# Patient Record
Sex: MALE | Race: WHITE | Employment: OTHER | URBAN - METROPOLITAN AREA
[De-identification: names, ages, dates, MRNs, and addresses within clinical notes are randomized per-mention and may not be internally consistent; named-entity substitution may affect disease eponyms.]

---

## 2017-11-21 PROBLEM — R10.9 ABDOMINAL PAIN: Status: ACTIVE | Noted: 2017-11-21

## 2017-12-22 PROBLEM — R27.0 ATAXIA: Status: ACTIVE | Noted: 2017-12-22

## 2019-05-28 ENCOUNTER — APPOINTMENT (OUTPATIENT)
Dept: CT IMAGING | Age: 84
DRG: 065 | End: 2019-05-28
Payer: MEDICARE

## 2019-05-28 ENCOUNTER — APPOINTMENT (OUTPATIENT)
Dept: MRI IMAGING | Age: 84
DRG: 065 | End: 2019-05-28
Payer: MEDICARE

## 2019-05-28 ENCOUNTER — HOSPITAL ENCOUNTER (INPATIENT)
Age: 84
LOS: 3 days | Discharge: HOME HEALTH | DRG: 065 | End: 2019-05-31
Attending: EMERGENCY MEDICINE | Admitting: INTERNAL MEDICINE
Payer: MEDICARE

## 2019-05-28 DIAGNOSIS — R47.1 DYSARTHRIA: Primary | ICD-10-CM

## 2019-05-28 DIAGNOSIS — R26.9 GAIT ABNORMALITY: ICD-10-CM

## 2019-05-28 PROBLEM — G45.9 TIA (TRANSIENT ISCHEMIC ATTACK): Status: ACTIVE | Noted: 2019-05-28

## 2019-05-28 LAB
ANION GAP SERPL CALCULATED.3IONS-SCNC: 17 MMOL/L (ref 3–16)
BASE EXCESS VENOUS: -1 (ref -3–3)
BASOPHILS ABSOLUTE: 0.1 K/UL (ref 0–0.2)
BASOPHILS RELATIVE PERCENT: 1.2 %
BILIRUBIN URINE: NEGATIVE
BLOOD, URINE: NEGATIVE
BUN BLDV-MCNC: 15 MG/DL (ref 7–20)
CALCIUM IONIZED: 1.13 MMOL/L (ref 1.12–1.32)
CALCIUM SERPL-MCNC: 8.9 MG/DL (ref 8.3–10.6)
CHLORIDE BLD-SCNC: 105 MMOL/L (ref 99–110)
CLARITY: CLEAR
CO2: 19 MMOL/L (ref 21–32)
CO2: 24 MMOL/L (ref 21–32)
COLOR: YELLOW
CREAT SERPL-MCNC: 0.7 MG/DL (ref 0.8–1.3)
EKG ATRIAL RATE: 78 BPM
EKG DIAGNOSIS: NORMAL
EKG P AXIS: 49 DEGREES
EKG P-R INTERVAL: 264 MS
EKG Q-T INTERVAL: 372 MS
EKG QRS DURATION: 102 MS
EKG QTC CALCULATION (BAZETT): 424 MS
EKG R AXIS: 40 DEGREES
EKG T AXIS: 116 DEGREES
EKG VENTRICULAR RATE: 78 BPM
EOSINOPHILS ABSOLUTE: 0.5 K/UL (ref 0–0.6)
EOSINOPHILS RELATIVE PERCENT: 5.7 %
GFR AFRICAN AMERICAN: >60
GFR AFRICAN AMERICAN: >60
GFR NON-AFRICAN AMERICAN: >60
GFR NON-AFRICAN AMERICAN: >60
GLUCOSE BLD-MCNC: 117 MG/DL (ref 70–99)
GLUCOSE BLD-MCNC: 127 MG/DL (ref 70–99)
GLUCOSE BLD-MCNC: 130 MG/DL (ref 70–99)
GLUCOSE BLD-MCNC: 138 MG/DL (ref 70–99)
GLUCOSE BLD-MCNC: 94 MG/DL (ref 70–99)
GLUCOSE URINE: NEGATIVE MG/DL
HCO3 VENOUS: 24.1 MMOL/L (ref 23–29)
HCT VFR BLD CALC: 47.6 % (ref 40.5–52.5)
HEMOGLOBIN: 15.8 G/DL (ref 13.5–17.5)
INR BLD: 0.95 (ref 0.86–1.14)
KETONES, URINE: NEGATIVE MG/DL
LACTATE: 3.26 MMOL/L (ref 0.4–2)
LEUKOCYTE ESTERASE, URINE: NEGATIVE
LYMPHOCYTES ABSOLUTE: 1.4 K/UL (ref 1–5.1)
LYMPHOCYTES RELATIVE PERCENT: 17 %
MCH RBC QN AUTO: 31.3 PG (ref 26–34)
MCHC RBC AUTO-ENTMCNC: 33.1 G/DL (ref 31–36)
MCV RBC AUTO: 94.8 FL (ref 80–100)
MICROSCOPIC EXAMINATION: NORMAL
MONOCYTES ABSOLUTE: 0.8 K/UL (ref 0–1.3)
MONOCYTES RELATIVE PERCENT: 9.5 %
NEUTROPHILS ABSOLUTE: 5.3 K/UL (ref 1.7–7.7)
NEUTROPHILS RELATIVE PERCENT: 66.6 %
NITRITE, URINE: NEGATIVE
O2 SAT, VEN: 62 %
PCO2, VEN: 40.3 MM HG (ref 40–50)
PDW BLD-RTO: 14 % (ref 12.4–15.4)
PERFORMED ON: ABNORMAL
PERFORMED ON: NORMAL
PH UA: 7 (ref 5–8)
PH VENOUS: 7.38 (ref 7.35–7.45)
PLATELET # BLD: 250 K/UL (ref 135–450)
PMV BLD AUTO: 9.5 FL (ref 5–10.5)
PO2, VEN: 33 MM HG
POC ANION GAP: 13 (ref 10–20)
POC BUN: 16 MG/DL (ref 7–18)
POC CHLORIDE: 105 MMOL/L (ref 99–110)
POC CREATININE: 0.8 MG/DL (ref 0.8–1.3)
POC POTASSIUM: 3.7 MMOL/L (ref 3.5–5.1)
POC SAMPLE TYPE: ABNORMAL
POC SAMPLE TYPE: ABNORMAL
POC SODIUM: 142 MMOL/L (ref 136–145)
POTASSIUM REFLEX MAGNESIUM: 4.3 MMOL/L (ref 3.5–5.1)
PROTEIN UA: NEGATIVE MG/DL
PROTHROMBIN TIME: 10.8 SEC (ref 9.8–13)
RBC # BLD: 5.03 M/UL (ref 4.2–5.9)
SODIUM BLD-SCNC: 141 MMOL/L (ref 136–145)
SPECIFIC GRAVITY UA: <=1.005 (ref 1–1.03)
TCO2 CALC VENOUS: 25 MMOL/L
TROPONIN: <0.01 NG/ML
URINE REFLEX TO CULTURE: NORMAL
URINE TYPE: NORMAL
UROBILINOGEN, URINE: 0.2 E.U./DL
WBC # BLD: 8 K/UL (ref 4–11)

## 2019-05-28 PROCEDURE — 85025 COMPLETE CBC W/AUTO DIFF WBC: CPT

## 2019-05-28 PROCEDURE — 80047 BASIC METABLC PNL IONIZED CA: CPT

## 2019-05-28 PROCEDURE — 93005 ELECTROCARDIOGRAM TRACING: CPT | Performed by: INTERNAL MEDICINE

## 2019-05-28 PROCEDURE — 85610 PROTHROMBIN TIME: CPT

## 2019-05-28 PROCEDURE — 36415 COLL VENOUS BLD VENIPUNCTURE: CPT

## 2019-05-28 PROCEDURE — 92610 EVALUATE SWALLOWING FUNCTION: CPT

## 2019-05-28 PROCEDURE — 96365 THER/PROPH/DIAG IV INF INIT: CPT

## 2019-05-28 PROCEDURE — 94760 N-INVAS EAR/PLS OXIMETRY 1: CPT

## 2019-05-28 PROCEDURE — 84484 ASSAY OF TROPONIN QUANT: CPT

## 2019-05-28 PROCEDURE — 81003 URINALYSIS AUTO W/O SCOPE: CPT

## 2019-05-28 PROCEDURE — 70450 CT HEAD/BRAIN W/O DYE: CPT

## 2019-05-28 PROCEDURE — 92526 ORAL FUNCTION THERAPY: CPT

## 2019-05-28 PROCEDURE — 83605 ASSAY OF LACTIC ACID: CPT

## 2019-05-28 PROCEDURE — 1200000000 HC SEMI PRIVATE

## 2019-05-28 PROCEDURE — 6370000000 HC RX 637 (ALT 250 FOR IP): Performed by: INTERNAL MEDICINE

## 2019-05-28 PROCEDURE — 6360000004 HC RX CONTRAST MEDICATION: Performed by: EMERGENCY MEDICINE

## 2019-05-28 PROCEDURE — 83036 HEMOGLOBIN GLYCOSYLATED A1C: CPT

## 2019-05-28 PROCEDURE — 6360000002 HC RX W HCPCS: Performed by: INTERNAL MEDICINE

## 2019-05-28 PROCEDURE — 2580000003 HC RX 258: Performed by: INTERNAL MEDICINE

## 2019-05-28 PROCEDURE — 70496 CT ANGIOGRAPHY HEAD: CPT

## 2019-05-28 PROCEDURE — 2580000003 HC RX 258: Performed by: EMERGENCY MEDICINE

## 2019-05-28 PROCEDURE — 82803 BLOOD GASES ANY COMBINATION: CPT

## 2019-05-28 PROCEDURE — 99291 CRITICAL CARE FIRST HOUR: CPT

## 2019-05-28 PROCEDURE — 70551 MRI BRAIN STEM W/O DYE: CPT

## 2019-05-28 PROCEDURE — 70498 CT ANGIOGRAPHY NECK: CPT

## 2019-05-28 RX ORDER — ATORVASTATIN CALCIUM 40 MG/1
40 TABLET, FILM COATED ORAL EVERY OTHER DAY
Status: DISCONTINUED | OUTPATIENT
Start: 2019-05-29 | End: 2019-05-31 | Stop reason: HOSPADM

## 2019-05-28 RX ORDER — DEXTROSE MONOHYDRATE 50 MG/ML
100 INJECTION, SOLUTION INTRAVENOUS PRN
Status: DISCONTINUED | OUTPATIENT
Start: 2019-05-28 | End: 2019-05-28 | Stop reason: SDUPTHER

## 2019-05-28 RX ORDER — GABAPENTIN 300 MG/1
300 CAPSULE ORAL 2 TIMES DAILY
Status: DISCONTINUED | OUTPATIENT
Start: 2019-05-28 | End: 2019-05-31 | Stop reason: HOSPADM

## 2019-05-28 RX ORDER — ONDANSETRON 2 MG/ML
4 INJECTION INTRAMUSCULAR; INTRAVENOUS EVERY 6 HOURS PRN
Status: DISCONTINUED | OUTPATIENT
Start: 2019-05-28 | End: 2019-05-31 | Stop reason: HOSPADM

## 2019-05-28 RX ORDER — SODIUM CHLORIDE 0.9 % (FLUSH) 0.9 %
10 SYRINGE (ML) INJECTION EVERY 12 HOURS SCHEDULED
Status: DISCONTINUED | OUTPATIENT
Start: 2019-05-28 | End: 2019-05-31 | Stop reason: HOSPADM

## 2019-05-28 RX ORDER — SODIUM CHLORIDE, SODIUM LACTATE, POTASSIUM CHLORIDE, AND CALCIUM CHLORIDE .6; .31; .03; .02 G/100ML; G/100ML; G/100ML; G/100ML
1000 INJECTION, SOLUTION INTRAVENOUS ONCE
Status: COMPLETED | OUTPATIENT
Start: 2019-05-28 | End: 2019-05-28

## 2019-05-28 RX ORDER — ASPIRIN 81 MG/1
81 TABLET ORAL
Status: DISCONTINUED | OUTPATIENT
Start: 2019-05-28 | End: 2019-05-29

## 2019-05-28 RX ORDER — DEXTROSE MONOHYDRATE 25 G/50ML
12.5 INJECTION, SOLUTION INTRAVENOUS PRN
Status: DISCONTINUED | OUTPATIENT
Start: 2019-05-28 | End: 2019-05-28 | Stop reason: SDUPTHER

## 2019-05-28 RX ORDER — GABAPENTIN 300 MG/1
300 CAPSULE ORAL 2 TIMES DAILY
COMMUNITY

## 2019-05-28 RX ORDER — ATORVASTATIN CALCIUM 40 MG/1
40 TABLET, FILM COATED ORAL EVERY OTHER DAY
Status: DISCONTINUED | OUTPATIENT
Start: 2019-05-28 | End: 2019-05-28

## 2019-05-28 RX ORDER — DEXTROSE MONOHYDRATE 25 G/50ML
12.5 INJECTION, SOLUTION INTRAVENOUS PRN
Status: DISCONTINUED | OUTPATIENT
Start: 2019-05-28 | End: 2019-05-31 | Stop reason: HOSPADM

## 2019-05-28 RX ORDER — GABAPENTIN 600 MG/1
600 TABLET ORAL NIGHTLY
Status: DISCONTINUED | OUTPATIENT
Start: 2019-05-28 | End: 2019-05-31 | Stop reason: HOSPADM

## 2019-05-28 RX ORDER — SODIUM CHLORIDE 0.9 % (FLUSH) 0.9 %
10 SYRINGE (ML) INJECTION PRN
Status: DISCONTINUED | OUTPATIENT
Start: 2019-05-28 | End: 2019-05-31 | Stop reason: HOSPADM

## 2019-05-28 RX ORDER — NICOTINE POLACRILEX 4 MG
15 LOZENGE BUCCAL PRN
Status: DISCONTINUED | OUTPATIENT
Start: 2019-05-28 | End: 2019-05-28 | Stop reason: SDUPTHER

## 2019-05-28 RX ORDER — NICOTINE POLACRILEX 4 MG
15 LOZENGE BUCCAL PRN
Status: DISCONTINUED | OUTPATIENT
Start: 2019-05-28 | End: 2019-05-31 | Stop reason: HOSPADM

## 2019-05-28 RX ORDER — DEXTROSE MONOHYDRATE 50 MG/ML
100 INJECTION, SOLUTION INTRAVENOUS PRN
Status: DISCONTINUED | OUTPATIENT
Start: 2019-05-28 | End: 2019-05-31 | Stop reason: HOSPADM

## 2019-05-28 RX ORDER — GABAPENTIN 600 MG/1
600 TABLET ORAL NIGHTLY
COMMUNITY

## 2019-05-28 RX ADMIN — ENOXAPARIN SODIUM 40 MG: 40 INJECTION SUBCUTANEOUS at 17:23

## 2019-05-28 RX ADMIN — GABAPENTIN 600 MG: 600 TABLET, FILM COATED ORAL at 20:56

## 2019-05-28 RX ADMIN — SODIUM CHLORIDE, POTASSIUM CHLORIDE, SODIUM LACTATE AND CALCIUM CHLORIDE 1000 ML: 600; 310; 30; 20 INJECTION, SOLUTION INTRAVENOUS at 09:56

## 2019-05-28 RX ADMIN — INSULIN GLARGINE 30 UNITS: 100 INJECTION, SOLUTION SUBCUTANEOUS at 20:58

## 2019-05-28 RX ADMIN — ASPIRIN 81 MG: 81 TABLET, COATED ORAL at 17:23

## 2019-05-28 RX ADMIN — IOPAMIDOL 80 ML: 755 INJECTION, SOLUTION INTRAVENOUS at 09:14

## 2019-05-28 RX ADMIN — GABAPENTIN 300 MG: 300 CAPSULE ORAL at 17:23

## 2019-05-28 RX ADMIN — Medication 10 ML: at 21:00

## 2019-05-28 ASSESSMENT — PAIN SCALES - GENERAL: PAINLEVEL_OUTOF10: 0

## 2019-05-28 NOTE — PROGRESS NOTES
Patient admitted to [facility], patient alert and oriented x4, VSS. NIHSS 1, neuro checks WNL. Patient and family feel like speech and aphasia has improved since this morning. Speech called to evaluate patient. Patient oriented to room and call light, call light and belongings within reach. Bed alarm on for safety. Will continue to monitor.

## 2019-05-28 NOTE — CONSULTS
Medications Prior to Admission:      Home Medications           Prior to Admission medications    Medication Sig Start Date End Date Taking? Authorizing Provider   gabapentin (NEURONTIN) 300 MG capsule Take 300 mg by mouth 2 times daily. Indications: breakfast and lunch     Yes Historical Provider, MD   gabapentin (NEURONTIN) 600 MG tablet Take 600 mg by mouth nightly.     Yes Historical Provider, MD   aspirin 81 MG tablet Take 81 mg by mouth Daily with supper     Yes Historical Provider, MD   metFORMIN (GLUCOPHAGE) 1000 MG tablet Take 2,000 mg by mouth nightly     Yes Historical Provider, MD   Insulin Detemir (LEVEMIR FLEXPEN SC) Inject 30 Units into the skin nightly     Yes Historical Provider, MD   Misc. Devices (ROLLER Doran) MISC 1 each by Does not apply route daily 12/24/17     Ricky Dunne MD   Exenatide (BYDUREON) 2 MG PEN Inject 2 mg into the skin every 7 days        Historical Provider, MD   atorvastatin (LIPITOR) 40 MG tablet Take 40 mg by mouth every other day       Historical Provider, MD         ROS:   Constitutional- No weight loss or fevers, no night sweats   Eyes- No diplopia. No photophobia. Ears/nose/throat- No dysphagia (had some cough swallowing earlier today). + Dysarthria   Cardiovascular- No palpitations, +irregular pulse. No chest pain   Respiratory- No dyspnea. No Cough   Gastrointestinal- No Abdominal pain. No Vomiting. Musculoskeletal- + lower right arm pain  Skin- No rash. No easy bruising. Psychiatric- No depression. Endocrine- + diabetes   Hematologic- No bleeding difficulty. Neurologic- No weakness. No Headache. Exam:  Blood pressure (!) 161/88, pulse 90, temperature 98.1 °F (36.7 °C), temperature source Oral, resp. rate 16, height 6' 1\" (1.854 m), weight 192 lb (87.1 kg), SpO2 95 %. Constitutional    Vital signs: BP, HR, and RR reviewed   General Alert, no distress, well-nourished  Eyes: EOMI, PERRLA  Cardiovascular: pulses symmetric in all 4 extremities. No peripheral edema. Psychiatric: cooperative with examination, no  psychotic behavior noted. Neurologic  Mental status:   orientation to person, place, and time   General fund of knowledge grossly intact   Memory grossly intact. He remembers 2/3 words and third word with cue   Attention intact as able to attend well to the exam     Language fluent in conversation   Comprehension intact; follows simple commands  Cranial nerves:   CN2: Visual Fields full w/o extinction on confrontational testing,   CN 3,4,6: extraocular muscles intact,  CN5: facial sensation symmetric   CN7:face symmetric without dysarthria,   CN8: hearing grossly intact  CN9: palate elevated symmetrically  CN11: trap full strength on shoulder shrug  CN12: tongue midline with protrusion  Strength: No prontator drift. Good strength in all 4 extremities   Deep tendon reflexes: normal in all 4 extremities  Sensory: light touch intact in all 4 extremities. No sensory extinction on double simultaneous stimulation  Cerebellar/coordination: finger nose finger grossly normal although exam limited by intention tremor.  Heel to shin normal.   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       No visualized acute vessel occlusion.       Scattered areas of nonhemodynamically significant stenosis of the cervical arteries as detailed above.       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       No visualized acute vessel occlusion.       Scattered areas of nonhemodynamically significant stenosis of the cervical arteries as detailed above.       CT Head WO Contrast   Final Result       Age-related changes in the brain without acute intracranial abnormality. Impression:  Bea Gallagher is a 89 [address] y.o. male who presented with symptoms of TIA vs ischemic stroke. CT head was negative and CTA do not do not indicate hemodynamically significant stenosis. Recommendations:    TIA vs Stroke. Symptoms are resolving.  -He is on telemetry. He had echo in 11/2018 which showed mildly reduced systolic function and paradoxical septal motion. No major valve abnormalities. BP is high on admission and he is not on BP meds at home. Recent use of NSAIDs noted. -Agree with cardiac evaluation and permissive HTN  -MRI is pending  -Swallowing evaluation was normal  -He is on ASA and Lipitor 40 mg    Tremor  -Appears like intention tremor however he does have some rigidity of the RUE concerning for Parkinsons  -Advise outpatient follow-up for further evaluation of his tremor. A copy of this note was provided for Dr Trinh Crews MD     Will discuss with attending Dr. Vaughn Xiao.      Ericaluis alberto Dupree, PGY-2

## 2019-05-28 NOTE — ED NOTES
Pt given 3oz of water pt took the water in multiple sips and coughed each time. Pt did not know why he was coughing.       Maci Padron RN  05/28/19 [id number]

## 2019-05-28 NOTE — PROGRESS NOTES
Treatment Plan  Requires SLP Intervention: Yes  Duration/Frequency of Treatment: 2-4x  D/C Recommendations: To be determined       Recommended Diet and Intervention  Diet Solids Recommendation: Regular  Liquid Consistency Recommendation: Thin-Make NPO if s/s aspiration emerge and alert SLP  Will con't to monitor for need for full Speech Eval   Recommended Form of Meds: PO  Recommendations: Dysphagia treatment  Therapeutic Interventions: Diet tolerance monitoring;Patient/Family education    Compensatory Swallowing Strategies  Compensatory Swallowing Strategies: Upright as possible for all oral intake    Treatment/Goals  1-The patient will tolerate recommended diet without observed clinical signs of aspiration    2- The pt/family will demonstrate understanding of swallowing recommendations and concerns. 5/28-   The pt was educated to purpose of the visit, anatomy and physiology of the swallow, impact CVA can have on swallowing function, s/s of aspiration, swallowing strategies, diet recommendations and possibility of being made NPO if s/s aspiration emerge. The pt stated comprehension. con't goal      General  Chart Reviewed: Yes  Behavior/Cognition: Alert; Cooperative;Pleasant mood  Respiratory Status: Room air  Communication Observation: Functional  Follows Directions: Complex  Dentition: Some missing teeth;Poor dental/oral hygeine(has partials at home- son is bringing them today)  Patient Positioning: Upright in bed  Baseline Vocal Quality: Normal  Volitional Cough: Strong  Prior Dysphagia History: no history of dysphagia   Consistencies Administered: Soft solid; Thin - cup; Thin - straw; Ice Chips;Puree           Vision/Hearing  Vision  Vision: Impaired  Vision Exceptions: Wears glasses at all times  Hearing  Hearing: Within functional limits    Oral Motor Deficits  Oral/Motor  Oral Motor: Within functional limits    Oral Phase Dysfunction  no anterior spillage or oral residue with any consistency.  Mastication

## 2019-05-28 NOTE — ED PROVIDER NOTES
ASPIRIN 81 MG TABLET    Take 81 mg by mouth Daily with supper    ATORVASTATIN (LIPITOR) 40 MG TABLET    Take 40 mg by mouth every other day    EXENATIDE (BYDUREON) 2 MG PEN    Inject 2 mg into the skin every 7 days     GABAPENTIN (NEURONTIN) 300 MG CAPSULE    Take 300 mg by mouth 2 times daily. Indications: breakfast and lunch    GABAPENTIN (NEURONTIN) 600 MG TABLET    Take 600 mg by mouth nightly. INSULIN DETEMIR (LEVEMIR FLEXPEN SC)    Inject 30 Units into the skin nightly    METFORMIN (GLUCOPHAGE) 1000 MG TABLET    Take 2,000 mg by mouth nightly    MISC. DEVICES (ROLLER WALKER) MISC    1 each by Does not apply route daily       Allergies     He is allergic to cat hair extract and tetracyclines & related. Physical Exam     INITIAL VITALS: BP: (!) 189/94, Temp: 98.3 °F (36.8 °C), Pulse: 82, Resp: 12, SpO2: 95 %   Physical Exam   Constitutional: He is oriented to person, place, and time. He appears well-developed and well-nourished. HENT:   Head: Normocephalic and atraumatic. Very subtle/questionable left-sided nasolabial fold flattening. Tongue protrusion to the left although tongue movement crosses midline normally   Eyes: Pupils are equal, round, and reactive to light. EOM are normal.   Neck: Normal range of motion. Neck supple. Cardiovascular: Normal rate, regular rhythm, normal heart sounds and intact distal pulses. Pulmonary/Chest: Effort normal and breath sounds normal. He has no wheezes. Abdominal: Soft. Bowel sounds are normal. There is no tenderness. Musculoskeletal: Normal range of motion. He exhibits no edema or tenderness. Tremor in the left upper extremity which patient states that his baseline. Normal  strength. Neurological: He is alert and oriented to person, place, and time. Dysarthria noted without a fascia. Patient does have some tremor in his left upper extremity with some difficulty with finger-nose.   He is unsteady when ambulating but is able to stand under (Results Pending)       LABS:   Results for orders placed or performed during the hospital encounter of 05/28/19   CBC Auto Differential   Result Value Ref Range    WBC 8.0 4.0 - 11.0 K/uL    RBC 5.03 4.20 - 5.90 M/uL    Hemoglobin 15.8 13.5 - 17.5 g/dL    Hematocrit 47.6 40.5 - 52.5 %    MCV 94.8 80.0 - 100.0 fL    MCH 31.3 26.0 - 34.0 pg    MCHC 33.1 31.0 - 36.0 g/dL    RDW 14.0 12.4 - 15.4 %    Platelets 328 240 - 638 K/uL    MPV 9.5 5.0 - 10.5 fL    Neutrophils % 66.6 %    Lymphocytes % 17.0 %    Monocytes % 9.5 %    Eosinophils % 5.7 %    Basophils % 1.2 %    Neutrophils # 5.3 1.7 - 7.7 K/uL    Lymphocytes # 1.4 1.0 - 5.1 K/uL    Monocytes # 0.8 0.0 - 1.3 K/uL    Eosinophils # 0.5 0.0 - 0.6 K/uL    Basophils # 0.1 0.0 - 0.2 K/uL   Basic Metabolic Panel w/ Reflex to MG   Result Value Ref Range    Sodium 141 136 - 145 mmol/L    Potassium reflex Magnesium 4.3 3.5 - 5.1 mmol/L    Chloride 105 99 - 110 mmol/L    CO2 19 (L) 21 - 32 mmol/L    Anion Gap 17 (H) 3 - 16    Glucose 127 (H) 70 - 99 mg/dL    BUN 15 7 - 20 mg/dL    CREATININE 0.7 (L) 0.8 - 1.3 mg/dL    GFR Non-African American >60 >60    GFR African American >60 >60    Calcium 8.9 8.3 - 10.6 mg/dL   Troponin   Result Value Ref Range    Troponin <0.01 <0.01 ng/mL   Protime-INR   Result Value Ref Range    Protime 10.8 9.8 - 13.0 sec    INR 0.95 0.86 - 1.14   Urinalysis Reflex to Culture   Result Value Ref Range    Color, UA Yellow Straw/Yellow    Clarity, UA Clear Clear    Glucose, Ur Negative Negative mg/dL    Bilirubin Urine Negative Negative    Ketones, Urine Negative Negative mg/dL    Specific Gravity, UA <=1.005 1.005 - 1.030    Blood, Urine Negative Negative    pH, UA 7.0 5.0 - 8.0    Protein, UA Negative Negative mg/dL    Urobilinogen, Urine 0.2 <2.0 E.U./dL    Nitrite, Urine Negative Negative    Leukocyte Esterase, Urine Negative Negative    Microscopic Examination Not Indicated     Urine Reflex to Culture Not Indicated     Urine Type Not Specified POCT Glucose   Result Value Ref Range    POC Glucose 117 (H) 70 - 99 mg/dl    Performed on ACCU-CHEK    POCT Venous   Result Value Ref Range    pH, Judith 7.385 7.350 - 7.450    pCO2, Judith 40.3 40.0 - 50.0 mm Hg    pO2, Judith 33 Not Established mm Hg    HCO3, Venous 24.1 23.0 - 29.0 mmol/L    Base Excess, Judith -1 -3 - 3    O2 Sat, Judith 62 Not Established %    TC02 (Calc), Judith 25 Not Established mmol/L    Lactate 3.26 (H) 0.40 - 2.00 mmol/L    Sample Type JUDITH     Performed on SEE BELOW    POCT Venous   Result Value Ref Range    POC Sodium 142 136 - 145 mmol/L    POC Potassium 3.7 3.5 - 5.1 mmol/L    POC Chloride 105 99 - 110 mmol/L    CO2 24 21 - 32 mmol/L    POC Anion Gap 13 10 - 20    POC Glucose 130 (H) 70 - 99 mg/dl    POC BUN 16 7 - 18 mg/dL    POC Creatinine 0.8 0.8 - 1.3 mg/dL    GFR Non-African American >60 >60    GFR African American >60     Calcium, Ion 1.13 1.12 - 1.32 mmol/L    Sample Type JUDITH     Performed on SEE BELOW        ED BEDSIDE ULTRASOUND:      RECENT VITALS:  BP: (!) 158/89, Temp: 98.3 °F (36.8 °C),Pulse: 91, Resp: 15, SpO2: 90 %     Procedures         ED Course     Nursing Notes, Past Medical Hx, Past Surgical Hx, Social Hx, Allergies, and Family Hx werereviewed. The patient was given thefollowing medications:  Orders Placed This Encounter   Medications    lactated ringers bolus    iopamidol (ISOVUE-370) 76 % injection 80 mL       CONSULTS:  IP CONSULT TO STROKE TEAM  IP CONSULT TO HOSPITALIST  IP CONSULT TO [address] [place] [address] / ASSESSMENT / Mary Chahal is a 80 y.o. male who presents with dysarthria and some additional findings after waking up this morning. Concern is for a wake-up stroke. Initial NIH stroke scale for me is 3 with some questionable left-sided nasolabial fold flattening, dysarthria, and some ataxia. It is difficult with the ataxia is old versus new given his intention tremor. ct/cta was performed which did not show a large vessel occlusion.   the case was discussed with the stroke team and he is not a candidate for tpa given the fact that he woke up with these symptoms and is not a candidate for angiography versus perfusion imaging as he does not have a large vessel occlusion. He did have an elevation in his lactic acid was given fluids. Unclear as to the source of the elevation. He will be admitted to the hospitalist pending further evaluation for possible stroke versus TIA. MRI has been ordered. .    Critical Care:  Due to the immediate potential for life-threatening deterioration due to possible stroke, I spent 33 minutes providing critical care. This time excludes timespent performing procedures but includes time spent on direct patient care, history retrieval, review of the chart, and discussions with patient, family, and consultant(s). Clinical Impression     1. Dysarthria    2. Gait abnormality        Disposition     PATIENT REFERRED TO:  Pauline Young MD  [id number] [place] [id number]  [phone]            DISCHARGE MEDICATIONS:  New Prescriptions    No medications on file       DISPOSITION Admitted 05/28/2019 12:01:49 PM         Adam Wilkes MD  05/28/19 [id number]

## 2019-05-28 NOTE — H&P
Hospital Medicine History & Physical      PCP: Toma Allison MD    Date of Admission: 5/28/2019    Date of Service: Pt seen/examined on 5/28/2019  8:03 AM and   Admitted to Inpatient with expected LOS greater than two midnights due to medical therapy. Chief Complaint:  Difficulty speech and gait problems    History Of Present Illness:    80 y.o. male with PMHx significant for attention, type 2 diabetes mellitus, hyperlipidemia admitted with speech difficulties and gait difficulties. Patient went to bed around 11 PM last night and then woke up this morning at around 7 and noticed that he had difficulty with speech and walking. He is still up to go to the bathroom and felt extremely unsteady. He denied any weakness in particular extremity just felt that he was very unsteady and could not get his balance like. He also noticed that his speech was not very clear and a difficulty processing and understanding language and also articulating his words  He denied any headache visual complaints  No chest pain shortness of breath  No lightheadedness  When I saw him in the emergency department he was almost back to baseline    Past Medical History:          Diagnosis Date    Diabetes mellitus ([facility] [address]     Hyperlipidemia     Tremor        Past Surgical History:      History reviewed. No pertinent surgical history. Medications Prior to Admission:      Prior to Admission medications    Medication Sig Start Date End Date Taking? Authorizing Provider   gabapentin (NEURONTIN) 300 MG capsule Take 300 mg by mouth 2 times daily. Indications: breakfast and lunch   Yes Historical Provider, MD   gabapentin (NEURONTIN) 600 MG tablet Take 600 mg by mouth nightly.    Yes Historical Provider, MD   aspirin 81 MG tablet Take 81 mg by mouth Daily with supper   Yes Historical Provider, MD   metFORMIN (GLUCOPHAGE) 1000 MG tablet Take 2,000 mg by mouth nightly   Yes Historical Provider, MD   Insulin Detemir (LEVEMIR FLEXPEN SC) oriented, thought content appropriate, normal insight  Capillary Refill: Brisk,< 3 seconds   Peripheral Pulses: +2 palpable, equal bilaterally       Labs:     Recent Labs     05/28/19  0837   WBC 8.0   HGB 15.8   HCT 47.6        Recent Labs     05/28/19  0837 05/28/19  0840     --    K 4.3  --      --    CO2 19* 24   BUN 15  --    CREATININE 0.7* 0.8   CALCIUM 8.9  --      No results for input(s): AST, ALT, BILIDIR, BILITOT, ALKPHOS in the last 72 hours. 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 No visualized acute vessel occlusion. Scattered areas of nonhemodynamically significant stenosis of the cervical arteries as detailed above. 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 No visualized acute vessel occlusion. Scattered areas of nonhemodynamically significant stenosis of the cervical arteries as detailed above. CT Head WO Contrast   Final Result      Age-related changes in the brain without acute intracranial abnormality.             MRI BRAIN WO CONTRAST    (Results Pending)       ASSESSMENT/PLAN:    Active Hospital Problems    Diagnosis Date Noted    TIA (transient ischemic attack) [G45.9] 05/28/2019       Acute Medical Issues Being Addressed:    58-year-old gentleman admitted to the hospital with speech and gait difficulties    Dysarthria and gait abnormalities will need to rule out TIA  CT angiogram of the head and neck negative  Cardiac monitor  Strick intake and output  Aspirin and statin  Will get MRI of the brain without contrast  Cardiology consult  Check echocardiogram    Type 2 diabetes mellitus  Continue Lantus  Sliding-scale insulin    Hyperlipidemia  Continue statin      DVT Prophylaxis: sc lovenox   Diet: Diet NPO Effective Now  Code Status: Full Code    PT/OT Eval Status: will have eval if appropriate given patient's condition    Dispo - once acute medical process is resolved       Lacy Monroe MD    Thank you Corina Jacobsen MD for the opportunity to be involved in this patient's care. If you have any questions or concerns please feel free to contact me.

## 2019-05-29 LAB
A/G RATIO: 2 (ref 1.1–2.2)
ALBUMIN SERPL-MCNC: 4.1 G/DL (ref 3.4–5)
ALP BLD-CCNC: 75 U/L (ref 40–129)
ALT SERPL-CCNC: 13 U/L (ref 10–40)
ANION GAP SERPL CALCULATED.3IONS-SCNC: 12 MMOL/L (ref 3–16)
AST SERPL-CCNC: 13 U/L (ref 15–37)
BASOPHILS ABSOLUTE: 0.1 K/UL (ref 0–0.2)
BASOPHILS RELATIVE PERCENT: 0.9 %
BILIRUB SERPL-MCNC: 0.7 MG/DL (ref 0–1)
BILIRUBIN DIRECT: <0.2 MG/DL (ref 0–0.3)
BILIRUBIN, INDIRECT: ABNORMAL MG/DL (ref 0–1)
BUN BLDV-MCNC: 15 MG/DL (ref 7–20)
CALCIUM SERPL-MCNC: 9.1 MG/DL (ref 8.3–10.6)
CHLORIDE BLD-SCNC: 106 MMOL/L (ref 99–110)
CO2: 23 MMOL/L (ref 21–32)
CREAT SERPL-MCNC: 0.7 MG/DL (ref 0.8–1.3)
EOSINOPHILS ABSOLUTE: 0.4 K/UL (ref 0–0.6)
EOSINOPHILS RELATIVE PERCENT: 4.8 %
ESTIMATED AVERAGE GLUCOSE: 148.5 MG/DL
GFR AFRICAN AMERICAN: >60
GFR NON-AFRICAN AMERICAN: >60
GLOBULIN: 2.1 G/DL
GLUCOSE BLD-MCNC: 113 MG/DL (ref 70–99)
GLUCOSE BLD-MCNC: 184 MG/DL (ref 70–99)
GLUCOSE BLD-MCNC: 87 MG/DL (ref 70–99)
GLUCOSE BLD-MCNC: 97 MG/DL (ref 70–99)
GLUCOSE BLD-MCNC: 99 MG/DL (ref 70–99)
HBA1C MFR BLD: 6.8 %
HCT VFR BLD CALC: 45.2 % (ref 40.5–52.5)
HEMOGLOBIN: 15.1 G/DL (ref 13.5–17.5)
INR BLD: 1.11 (ref 0.86–1.14)
LYMPHOCYTES ABSOLUTE: 1.1 K/UL (ref 1–5.1)
LYMPHOCYTES RELATIVE PERCENT: 12.8 %
MAGNESIUM: 1.8 MG/DL (ref 1.8–2.4)
MCH RBC QN AUTO: 30.8 PG (ref 26–34)
MCHC RBC AUTO-ENTMCNC: 33.4 G/DL (ref 31–36)
MCV RBC AUTO: 92.2 FL (ref 80–100)
MONOCYTES ABSOLUTE: 1 K/UL (ref 0–1.3)
MONOCYTES RELATIVE PERCENT: 10.8 %
NEUTROPHILS ABSOLUTE: 6.3 K/UL (ref 1.7–7.7)
NEUTROPHILS RELATIVE PERCENT: 70.7 %
PDW BLD-RTO: 13.7 % (ref 12.4–15.4)
PERFORMED ON: ABNORMAL
PERFORMED ON: ABNORMAL
PERFORMED ON: NORMAL
PERFORMED ON: NORMAL
PLATELET # BLD: 244 K/UL (ref 135–450)
PMV BLD AUTO: 8.8 FL (ref 5–10.5)
POTASSIUM REFLEX MAGNESIUM: 3.6 MMOL/L (ref 3.5–5.1)
PROTHROMBIN TIME: 12.6 SEC (ref 9.8–13)
RBC # BLD: 4.9 M/UL (ref 4.2–5.9)
SODIUM BLD-SCNC: 141 MMOL/L (ref 136–145)
TOTAL PROTEIN: 6.2 G/DL (ref 6.4–8.2)
WBC # BLD: 8.9 K/UL (ref 4–11)

## 2019-05-29 PROCEDURE — 36415 COLL VENOUS BLD VENIPUNCTURE: CPT

## 2019-05-29 PROCEDURE — 1200000000 HC SEMI PRIVATE

## 2019-05-29 PROCEDURE — 6370000000 HC RX 637 (ALT 250 FOR IP): Performed by: NURSE PRACTITIONER

## 2019-05-29 PROCEDURE — 85025 COMPLETE CBC W/AUTO DIFF WBC: CPT

## 2019-05-29 PROCEDURE — 83735 ASSAY OF MAGNESIUM: CPT

## 2019-05-29 PROCEDURE — 6360000002 HC RX W HCPCS: Performed by: INTERNAL MEDICINE

## 2019-05-29 PROCEDURE — 92526 ORAL FUNCTION THERAPY: CPT

## 2019-05-29 PROCEDURE — 80053 COMPREHEN METABOLIC PANEL: CPT

## 2019-05-29 PROCEDURE — 6370000000 HC RX 637 (ALT 250 FOR IP): Performed by: INTERNAL MEDICINE

## 2019-05-29 PROCEDURE — 85610 PROTHROMBIN TIME: CPT

## 2019-05-29 PROCEDURE — 2580000003 HC RX 258: Performed by: INTERNAL MEDICINE

## 2019-05-29 PROCEDURE — 80061 LIPID PANEL: CPT

## 2019-05-29 RX ADMIN — GABAPENTIN 300 MG: 300 CAPSULE ORAL at 07:59

## 2019-05-29 RX ADMIN — GABAPENTIN 600 MG: 600 TABLET, FILM COATED ORAL at 21:53

## 2019-05-29 RX ADMIN — ENOXAPARIN SODIUM 40 MG: 40 INJECTION SUBCUTANEOUS at 07:59

## 2019-05-29 RX ADMIN — INSULIN GLARGINE 30 UNITS: 100 INJECTION, SOLUTION SUBCUTANEOUS at 22:01

## 2019-05-29 RX ADMIN — ASPIRIN 325 MG: 325 TABLET, DELAYED RELEASE ORAL at 14:01

## 2019-05-29 RX ADMIN — ATORVASTATIN CALCIUM 40 MG: 40 TABLET, FILM COATED ORAL at 23:35

## 2019-05-29 RX ADMIN — Medication 10 ML: at 22:02

## 2019-05-29 RX ADMIN — GABAPENTIN 300 MG: 300 CAPSULE ORAL at 13:55

## 2019-05-29 ASSESSMENT — PAIN SCALES - GENERAL
PAINLEVEL_OUTOF10: 0

## 2019-05-29 NOTE — PROGRESS NOTES
Speech Language Pathology  Facility/Department: [facility] 5T ORTHO/NEURO  Dysphagia Daily Treatment Note/speech / cog screen    NAME: Tre Mcallister  : 10/27/1928  MRN: [id number]    Patient Diagnosis(es):   Patient Active Problem List    Diagnosis Date Noted    TIA (transient ischemic attack) 2019    Ataxia 2017    Chest pain     Gallstones     Abdominal pain 2017     Allergies: Allergies   Allergen Reactions    Cat Hair Extract      Nasal congestion    Tetracyclines & Related Rash       Recent Chest Xray-  Not performed this admission     CT of head 19  Age-related changes in the brain without acute intracranial abnormality.     MRI 19     1. Small acute ischemic insults in the right cerebellar hemisphere. The ischemic insult in the superior medial right cerebellum demonstrates associated petechial hemorrhagic change. 2. 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           Previous MBS - none    Chart reviewed. Medical Diagnosis: TIA  Treatment Diagnosis: dysphagia    BSE Impression 19  Pt reports speech difficulties are resolving. Pt without partial dentures which may be a factor with speech sound production. Pt reports he never eats without his dentures- son is to bring from home today. ROM or oral structures was [facility]. Oral- no anterior spillage or oral residue with any consistency. Mastication was prolonged, but pt typically eats with his dentures. Pharyngeal-Pt demonstrated no s/s aspiration with any consistency presented. Pt able to initiate swallow without difficulty with laryngeal movement observed. Vocal quality remained clear throughout. No coughing, throat clearing or choking observed with any consistency.  Pt consumed 3oz water uninterrupted by cup without difficulty.     MBS results -  Not indicated    Pain: none    Current Diet : regular with thin liquids    Treatment:  Pt seen bedside to address the following goals:  1-The patient will tolerate recommended diet without observed clinical signs of aspiration  5/29:  RN with no concerns for swallowing, pt /son deny problems as well. Pt analyzed with solid texture as well as thin liquids via cup and straw with no overt signs of aspiration. Volume of voice somewhat reduced, which pt and son state is baseline. Pt also reports having post nasal drip and has chronic throat clearing. Goal met, cont one more session to ensure consistency    2- The pt/family will demonstrate understanding of swallowing recommendations and concerns. 5/28-   The pt was educated to purpose of the visit, anatomy and physiology of the swallow, impact CVA can have on swallowing function, s/s of aspiration, swallowing strategies, diet recommendations and possibility of being made NPO if s/s aspiration emerge. The pt stated comprehension. con't goal  5/29; pt /son educated to purpose of visit, pt just spoke with neuro NP and is aware MRI showed acute stroke. Reviewed s/s of aspiration and concern if aspiration occurs. Instructed to notify staff if any signs emerge. Pt and son stated understanding and agreeable  Goal met, cont to ensure consistency    Speech/language/cognitive screen:  Discussed speech eval with pt, who was somewhat reluctant, as he felt  Speech back to baseline. Pt agreeable to completing the [facility], which pt scored 28/30, which is WNL's. Speech is clear and free of dysarthria, no word finding noted. Pt using humor, telling jokes and conversing appropriately. Instructed pt to notify staff if any cognitive/speech issues emerge. Patient/Family/Caregiver Education:  As above    Compensatory Strategies:  90 degrees all meals     Plan:  Continued daily Dysphagia treatment with goals per  plan of care.   Diet recommendations: cont regular diet/thin liquids- if any s/s of aspiration emerge, or there is respiratory decline,  make NPO until further evaluated by SLP  DC recommendation:most likely will not require follow up  Treatment: 25  D/W nursing [facility]  Needs met prior to leaving room, call button in reach. Daphney Lagos M.S./[facility]-SLP #[id number]  Pg.  # [phone]    If patient is discharged prior to next treatment, this note will serve as the discharge summary

## 2019-05-29 NOTE — PROGRESS NOTES
Pt a/o x4. VSS. No reports of nausea/ vomiting. Tolerating general diet. No complaints of pain. Pt is up x1 person assist. Pt tolerating activity well. Urinary output adequate. NIH score 0. Will continue to monitor.

## 2019-05-29 NOTE — PROGRESS NOTES
Neurology Follow Up  Note    Updates:  Seen for difficulty walking. He has no new complaints. ROS:  Constitutional: denies fatigue, malaise  Eyes: denies any vision changes,  Musculoskeletal: denies any pain or decreased ROM, no functional deficit. Neurological: denies headache, numbness or tingling, speech problems. Exam:  Blood pressure (!) 144/84, pulse 79, temperature 98 °F (36.7 °C), temperature source Oral, resp. rate 16, height 6' 1\" (1.854 m), weight 192 lb (87.1 kg), SpO2 93 %. Constitutional    Vital signs: BP, HR, and RR reviewed   General Alert, no distress, well-nourished  Eyes: fundoscopic exam not visualized. .   Cardiovascular: pulses symmetric in all 4 extremities. No peripheral edema. Psychiatric: cooperative with examination, no  psychotic behavior noted. Neurologic  Mental status:   orientation to person, place and time. General fund of knowledge grossly intact   Memory grossly intact   Attention intact as able to attend well to the exam     Language fluent in conversation   Comprehension intact; follows simple commands  Cranial nerves:   CN2: Visual Fields full w/o extinction on confrontational testing,   CN 3,4,6: extraocular muscles intact,  CN5: facial sensation symmetric   CN7:face symmetric without dysarthria,   CN8: hearing grossly intact  CN9: palate elevated symmetrically  CN11: trap full strength on shoulder shrug  CN12: tongue midline with protrusion  Strength: No prontator drift. Good strength in all 4 extremities   Deep tendon reflexes: normal in all 4 extremities  Sensory: light touch intact in all 4 extremities. No sensory extinction on double simultaneous stimulation  Cerebellar/coordination: finger nose finger normal without ataxia  Tone: normal in all 4 extremities  Gait: deferred due to safety concerns.        Labs:    CBC:   Lab Results   Component Value Date    WBC 8.9 05/29/2019    RBC 4.90 05/29/2019    HGB 15.1 05/29/2019    HCT 45.2 05/29/2019 MCV 92.2 05/29/2019    MCH 30.8 05/29/2019    MCHC 33.4 05/29/2019    RDW 13.7 05/29/2019     05/29/2019    MPV 8.8 05/29/2019     BMP:    Lab Results   Component Value Date     05/29/2019    K 3.6 05/29/2019     05/29/2019    CO2 23 05/29/2019    BUN 15 05/29/2019    LABALBU 4.1 05/29/2019    CREATININE 0.7 05/29/2019    CALCIUM 9.1 05/29/2019    GFRAA >60 05/29/2019    LABGLOM >60 05/29/2019    GLUCOSE 97 05/29/2019       Hepatic:   Recent Labs     05/29/19  0607   AST 13*   ALT 13   BILITOT 0.7   ALKPHOS 75     INR:   Recent Labs     05/28/19  0837 05/29/19  0607   INR 0.95 1.11     HGBA1c:  Recent Labs     05/28/19  0837   LABA1C 6.8       Studies:      MRI BRAIN WO CONTRAST   Final Result      1. Small acute ischemic insults in the right cerebellar hemisphere. The ischemic insult in the superior medial right cerebellum demonstrates associated petechial hemorrhagic change. 2. 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 No visualized acute vessel occlusion. Scattered areas of nonhemodynamically significant stenosis of the cervical arteries as detailed above. 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 No visualized acute vessel occlusion. Scattered areas of nonhemodynamically significant stenosis of the cervical arteries as detailed above. CT Head WO Contrast   Final Result      Age-related changes in the brain without acute intracranial abnormality.                 MRI of the head:05/28/19          Scheduled Meds:   aspirin  81 mg Oral Dinner    gabapentin  300 mg Oral BID    gabapentin  600 mg Oral Nightly    sodium chloride flush  10 mL Intravenous 2 times per day    enoxaparin

## 2019-05-29 NOTE — PROGRESS NOTES
Hospitalist Progress Note      PCP: Anne Lazo MD    Date of Admission: 5/28/2019    Chief Complaint: Gait disturbances    Hospital Course: This morning feels well  No gait disturbances  Problems  No abdominal pain nausea vomiting        Medications:  Reviewed      Exam:    BP (!) 152/80   Pulse 73   Temp 97.6 °F (36.4 °C) (Oral)   Resp 16   Ht 6' 1\" (1.854 m)   Wt 192 lb (87.1 kg)   SpO2 92%   BMI 25.33 kg/m²     General appearance: No apparent distress, appears stated age and cooperative. HEENT: Pupils equal, round, and reactive to light. Conjunctivae/corneas clear. Neck: Supple, with full range of motion. No jugular venous distention. Trachea midline. Respiratory:  Normal respiratory effort. Clear to auscultation, bilaterally without RALES/WHEEZES/Rhonchi. Cardiovascular: Regular rate and rhythm with normal S1/S2 without MURMURS, rubs or gallops. Abdomen: Soft, non-tender, non-distended with normal bowel sounds. Musculoskeletal: No clubbing, cyanosis or EDEMA bilaterally. Full range of motion without deformity. Skin: Skin color, texture, turgor normal.  No rashes or lesions. Neurologic:  Neurovascularly intact without any focal sensory/motor deficits.  Cranial nerves: II-XII intact, grossly non-focal.  Psychiatric: Alert and oriented, thought content appropriate, normal insight        Labs:   Recent Labs     05/28/19  0837 05/29/19  0607   WBC 8.0 8.9   HGB 15.8 15.1   HCT 47.6 45.2    244     Recent Labs     05/28/19  0837 05/28/19  0840 05/29/19  0607     --  141   K 4.3  --  3.6     --  106   CO2 19* 24 23   BUN 15  --  15   CREATININE 0.7* 0.8 0.7*   CALCIUM 8.9  --  9.1     Recent Labs     05/29/19  0607   AST 13*   ALT 13   BILIDIR <0.2   BILITOT 0.7   ALKPHOS 75     Recent Labs     05/28/19  0837 05/29/19  0607   INR 0.95 1.11     Recent Labs     05/28/19  0837   TROPONINI <0.01       Urinalysis:      Lab Results   Component Value Date    NITRU Negative 05/28/2019    BLOODU Negative 05/28/2019    SPECGRAV <=1.005 05/28/2019    GLUCOSEU Negative 05/28/2019       Radiology:  MRI BRAIN WO CONTRAST   Final Result      1. Small acute ischemic insults in the right cerebellar hemisphere. The ischemic insult in the superior medial right cerebellum demonstrates associated petechial hemorrhagic change. 2. 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 No visualized acute vessel occlusion. Scattered areas of nonhemodynamically significant stenosis of the cervical arteries as detailed above. 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 No visualized acute vessel occlusion. Scattered areas of nonhemodynamically significant stenosis of the cervical arteries as detailed above. CT Head WO Contrast   Final Result      Age-related changes in the brain without acute intracranial abnormality.                 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  CT angiogram of the head and neck negative  Strick intake and output  Aspirin and statin  RI of the brain showed multiple cerebellar infarcts with one area of hemorrhagic conversion as well   Will check echocardiogram  I suspect this may probable atrial fibrillation however none  Will discuss with neuro antiplatelet versus anticoagulation  All of the above dw with pt       Type 2 diabetes mellitus  Continue Lantus  Sliding-scale insulin     Hyperlipidemia  Continue statin          DVT Prophylaxis: sc lovenox   Diet: DIET CARDIAC;  Code Status: Full Code      Dispo - once acute medical processes have resolved    Raheem Navarrete MD

## 2019-05-30 ENCOUNTER — APPOINTMENT (OUTPATIENT)
Dept: VASCULAR LAB | Age: 84
DRG: 065 | End: 2019-05-30
Payer: MEDICARE

## 2019-05-30 LAB
CHOLESTEROL, TOTAL: 113 MG/DL (ref 0–199)
GLUCOSE BLD-MCNC: 119 MG/DL (ref 70–99)
GLUCOSE BLD-MCNC: 72 MG/DL (ref 70–99)
GLUCOSE BLD-MCNC: 75 MG/DL (ref 70–99)
GLUCOSE BLD-MCNC: 79 MG/DL (ref 70–99)
HDLC SERPL-MCNC: 37 MG/DL (ref 40–60)
LDL CHOLESTEROL CALCULATED: 54 MG/DL
LV EF: 50 %
LVEF MODALITY: NORMAL
PERFORMED ON: ABNORMAL
PERFORMED ON: NORMAL
TRIGL SERPL-MCNC: 112 MG/DL (ref 0–150)
VLDLC SERPL CALC-MCNC: 22 MG/DL

## 2019-05-30 PROCEDURE — 97116 GAIT TRAINING THERAPY: CPT

## 2019-05-30 PROCEDURE — 97530 THERAPEUTIC ACTIVITIES: CPT

## 2019-05-30 PROCEDURE — 93970 EXTREMITY STUDY: CPT

## 2019-05-30 PROCEDURE — 93923 UPR/LXTR ART STDY 3+ LVLS: CPT

## 2019-05-30 PROCEDURE — 97535 SELF CARE MNGMENT TRAINING: CPT

## 2019-05-30 PROCEDURE — 1200000000 HC SEMI PRIVATE

## 2019-05-30 PROCEDURE — 6370000000 HC RX 637 (ALT 250 FOR IP): Performed by: INTERNAL MEDICINE

## 2019-05-30 PROCEDURE — 97165 OT EVAL LOW COMPLEX 30 MIN: CPT

## 2019-05-30 PROCEDURE — C8929 TTE W OR WO FOL WCON,DOPPLER: HCPCS

## 2019-05-30 PROCEDURE — 97161 PT EVAL LOW COMPLEX 20 MIN: CPT

## 2019-05-30 PROCEDURE — 99222 1ST HOSP IP/OBS MODERATE 55: CPT | Performed by: INTERNAL MEDICINE

## 2019-05-30 PROCEDURE — 6360000002 HC RX W HCPCS: Performed by: INTERNAL MEDICINE

## 2019-05-30 PROCEDURE — 92526 ORAL FUNCTION THERAPY: CPT

## 2019-05-30 PROCEDURE — 6370000000 HC RX 637 (ALT 250 FOR IP): Performed by: NURSE PRACTITIONER

## 2019-05-30 PROCEDURE — 2580000003 HC RX 258: Performed by: INTERNAL MEDICINE

## 2019-05-30 RX ADMIN — GABAPENTIN 600 MG: 600 TABLET, FILM COATED ORAL at 21:47

## 2019-05-30 RX ADMIN — ENOXAPARIN SODIUM 40 MG: 40 INJECTION SUBCUTANEOUS at 08:50

## 2019-05-30 RX ADMIN — GABAPENTIN 300 MG: 300 CAPSULE ORAL at 13:24

## 2019-05-30 RX ADMIN — GABAPENTIN 300 MG: 300 CAPSULE ORAL at 08:50

## 2019-05-30 RX ADMIN — Medication 10 ML: at 08:50

## 2019-05-30 RX ADMIN — ASPIRIN 325 MG: 325 TABLET, DELAYED RELEASE ORAL at 08:50

## 2019-05-30 ASSESSMENT — PAIN SCALES - GENERAL
PAINLEVEL_OUTOF10: 0

## 2019-05-30 NOTE — PROGRESS NOTES
Physical Therapy    Facility/Department: [facility]  Initial Assessment and Treatment    NAME: Tomas More  : 10/27/1928  MRN: [id number]    Date of Service: 2019    Discharge Recommendations:Andrea Diaz scored a 18/24 on the AM-PAC short mobility form. Current research shows that an AM-PAC score of 18 or greater is typically associated with a discharge to the patient's home setting. Based on the patients AM-PAC score and their current functional mobility deficits, it is recommended that the patient have 2-3 sessions per week of Physical Therapy at d/c to increase the patients independence. PT Equipment Recommendations  Equipment Needed: No(pt has rollator at home)    Assessment   Body structures, Functions, Activity limitations: Decreased functional mobility ; Decreased endurance;Decreased balance  Assessment: Pt slightly below baseline function. Pt normally independent with mobility without AD, Independent with ADLs and drives. Currently needing CG/min assist for gt without AD, SBA with walker. Improved stability with use of walker. Discussed with pt using walker initially at home. Pt verbalized understanding. Should progress well with gradual increase in activity. Pt plans to return home at d/c. Rec increased assist as needed and home PT to maximize mobility and independence  Treatment Diagnosis: impaired functional mobility 2/2 decreased balance  Decision Making: Low Complexity  Patient Education: Educated pt on role of PT, use of call light, importance of OOB, discharge rec. Pt verbalized understanding  REQUIRES PT FOLLOW UP: Yes       Patient Diagnosis(es): The primary encounter diagnosis was Dysarthria. A diagnosis of Gait abnormality was also pertinent to this visit. has a past medical history of Diabetes mellitus ([facility] [address] Hyperlipidemia, and Tremor. has no past surgical history on file.     Restrictions  Position Activity Restriction  Other position/activity restrictions: up as tolerated  Vision/Hearing  Vision: Impaired  Vision Exceptions: Wears glasses at all times  Hearing: Within functional limits     Subjective  General  Chart Reviewed: Yes  Additional Pertinent Hx: Pt is a 80 y.o. male adm 5/28 with TIA. Came to ED with dysarthria and difficulty walking. Head CT: neg acute. CTA head/neck: high grade atheroclertoic irregularity of L carotid, no acute vessel occlusion. MRI brain: small acute ischemic insults R cerebellar hemisphere. PMH: DM, hyperlipidemia, tremor  Referring Practitioner: Beto Weiner MD  Referral Date : 05/29/19  Subjective  Subjective: Pt found in supine. Agreeable to PT  Pain Screening  Patient Currently in Pain: Denies  Vital Signs  Patient Currently in Pain: Denies       Orientation  Orientation  Overall Orientation Status: Within Functional Limits  Social/Functional History  Social/Functional History  Lives With: Alone  Type of Home: Apartment([facility])  Home Layout: One level  Home Access: (1 NORMA from garage)  Bathroom Shower/Tub: Walk-in shower  Bathroom Toilet: Standard  Bathroom Equipment: Grab bars in shower, Shower chair, Hand-held shower, Grab bars around toilet(pull cords in bathroom)  Home Equipment: 4 wheeled walker  ADL Assistance: Independent  Homemaking Assistance: Needs assistance(cleaning done 2x/month, does own laundry(in apartment), goes to dining room for dinner - breakfast and lunch on own)  Ambulation Assistance: Needs assistance(without AD)  Transfer Assistance: Needs assistance  Active : Yes  Leisure & Hobbies: read, plays on computer, walks, going to nature center  Additional Comments: Denies falls. Reports had trouble with walking, coordination and speech PTA. Did not notice any numbness/tingling or weakness. Feels like symptoms he came in with are resolving. Has 2 sons who both live out of town - one currently here visiting but will likely be going home soon.     Cognition Objective          AROM RLE (degrees)  RLE AROM: WFL  AROM LLE (degrees)  LLE AROM : WFL  Strength RLE  Strength RLE: (hip flex 4/5, otherwise 5/5)  Strength LLE  Strength LLE: (5/5 grossly)  Coordination  Rapid Alternating Movements: Normal(toe tapping)  Heel to Shin: Normal     Bed mobility  Supine to Sit: Stand by assistance(HOB slightly elevated)  Transfers  Sit to Stand: Contact guard assistance(increased time and effort from bed- took 2 to 3 attempts before successful, SBA from chair on first attempt)  Stand to sit: Contact guard assistance(to chair first trial with decreased eccentric control, SBA second trial to chair)  Ambulation  Ambulation?: Yes  Ambulation 1  Device: No Device  Assistance: Contact guard assistance(to min assist)  Quality of Gait: mildly guarded with decreased noble, decreased bilat step length/height, unsteady at times  Distance: 100'   Comments: Amb 79' with rolling walker SBA.   Improved stability and noble       Treatment included: bed mobility, transfers, gt, pt education          Plan   Plan  Times per week: 5-7  Current Treatment Recommendations: Balance Training, Functional Mobility Training, Endurance Training, Gait Training, Safety Education & Training, Patient/Caregiver Education & Training  Safety Devices  Type of devices: Call light within reach, Chair alarm in place, Nurse notified, Left in chair    G-Code       OutComes Score                                                  AM-PAC Score  AM-PAC Inpatient Mobility Raw Score : 18  AM-PAC Inpatient T-Scale Score : 43.63  Mobility Inpatient CMS 0-100% Score: 46.58  Mobility Inpatient CMS G-Code Modifier : CK          Goals  Short term goals  Time Frame for Short term goals: By discharge  Short term goal 1: Sup to sit independent  Short term goal 2: Sit to stand supervision  Short term goal 3: Pt will amb >80' with LRAD supervision       Therapy Time   Individual Concurrent Group Co-treatment   Time In 0812         Time Out 0850         Minutes 38               Timed Code Treatment Minutes:23       Total Treatment Minutes:  38  If pt d/c'd prior to next treatment, this note serves as a discharge note.     Asim Romero, PT

## 2019-05-30 NOTE — PROGRESS NOTES
Neurology Follow Up  Note    Updates:  Seen for difficulty walking. He has no new complaints. Son is at the bedside. ROS:  Constitutional: denies fatigue, malaise  Eyes: denies any vision changes,  Musculoskeletal: denies any pain or decreased ROM, no functional deficit. Neurological: denies headache, numbness or tingling, speech problems. Exam:  Blood pressure 134/71, pulse 76, temperature 97.6 °F (36.4 °C), temperature source Oral, resp. rate 16, height 6' 1\" (1.854 m), weight 183 lb (83 kg), SpO2 93 %. Constitutional    Vital signs: BP, HR, and RR reviewed   General Alert, no distress, well-nourished  Eyes: fundoscopic exam not visualized. .   Cardiovascular: pulses symmetric in all 4 extremities. No peripheral edema. Psychiatric: cooperative with examination, no  psychotic behavior noted. Neurologic  Mental status:   orientation to person, place and time. General fund of knowledge grossly intact   Memory grossly intact   Attention intact as able to attend well to the exam     Language fluent in conversation   Comprehension intact; follows simple commands  Cranial nerves:   CN2: Visual Fields full w/o extinction on confrontational testing,   CN 3,4,6: extraocular muscles intact,  CN5: facial sensation symmetric   CN7:face symmetric without dysarthria,   CN8: hearing grossly intact  CN9: palate elevated symmetrically  CN11: trap full strength on shoulder shrug  CN12: tongue midline with protrusion  Strength: No prontator drift. Good strength in all 4 extremities   Deep tendon reflexes: normal in all 4 extremities  Sensory: light touch intact in all 4 extremities. No sensory extinction on double simultaneous stimulation  Cerebellar/coordination: finger nose finger normal without ataxia  Tone: normal in all 4 extremities  Gait: deferred due to safety concerns.        Labs:    CBC:   Lab Results   Component Value Date    WBC 8.9 05/29/2019    RBC 4.90 05/29/2019    HGB 15.1 05/29/2019    HCT 45.2 05/29/2019    MCV 92.2 05/29/2019    MCH 30.8 05/29/2019    MCHC 33.4 05/29/2019    RDW 13.7 05/29/2019     05/29/2019    MPV 8.8 05/29/2019     BMP:    Lab Results   Component Value Date     05/29/2019    K 3.6 05/29/2019     05/29/2019    CO2 23 05/29/2019    BUN 15 05/29/2019    LABALBU 4.1 05/29/2019    CREATININE 0.7 05/29/2019    CALCIUM 9.1 05/29/2019    GFRAA >60 05/29/2019    LABGLOM >60 05/29/2019    GLUCOSE 97 05/29/2019       Hepatic:   Recent Labs     05/29/19  0607   AST 13*   ALT 13   BILITOT 0.7   ALKPHOS 75     INR:   Recent Labs     05/28/19  0837 05/29/19  0607   INR 0.95 1.11     HGBA1c:  Recent Labs     05/28/19  0837   LABA1C 6.8       Studies:      MRI BRAIN WO CONTRAST   Final Result      1. Small acute ischemic insults in the right cerebellar hemisphere. The ischemic insult in the superior medial right cerebellum demonstrates associated petechial hemorrhagic change. 2. 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 No visualized acute vessel occlusion. Scattered areas of nonhemodynamically significant stenosis of the cervical arteries as detailed above. 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 No visualized acute vessel occlusion. Scattered areas of nonhemodynamically significant stenosis of the cervical arteries as detailed above. CT Head WO Contrast   Final Result      Age-related changes in the brain without acute intracranial abnormality.                 MRI of the head:05/28/19          Scheduled Meds:   aspirin  325 mg Oral Daily    gabapentin  300 mg Oral BID    gabapentin  600 mg Oral Nightly    sodium chloride flush  10 mL Intravenous 2 times per day    enoxaparin  40 mg Subcutaneous Daily    insulin glargine  30 Units Subcutaneous Nightly    Exenatide  2 mg Subcutaneous Weekly    atorvastatin  40 mg Oral Every Other Day       Impression:  Sha Fernandez is a 80 y.o. male who presented with difficulty walking with bi-cerebellar cerebral infarctions on MRI of the brain embolic in nature. 1. Acute bi-cerebellar cerebral infarctions. 2. DM  3. HLD       Recommendations:  1. ECHO pending. Will need cardiac event monitor to assess for underlying atrial fibrillation. 2. Increase ASA to 325 mg po daily. 3. Continue statin therapy daily. Lipid panel pending. 4. PT evaluation and treatment.        A copy of this note was provided for MD Mamta Chaney, NORMAN-CNP  [facility]  [phone]  Evenings, weekends, and off weeks please discuss with the covering neurologist.

## 2019-05-30 NOTE — PROGRESS NOTES
Hospitalist Progress Note      PCP: Arleth Alcala MD    Date of Admission: 5/28/2019    Chief Complaint: Gait disturbances    Hospital Course:   Patient feels well  No acute events on telemetry  No chest pain or shortness of breath  No abdominal pain nausea vomiting      Medications:  Reviewed      Exam:    /85   Pulse 80   Temp 97.5 °F (36.4 °C) (Oral)   Resp 16   Ht 6' 1\" (1.854 m)   Wt 183 lb (83 kg)   SpO2 93%   BMI 24.14 kg/m²     General appearance: No apparent distress, appears stated age and cooperative. HEENT: Pupils equal, round, and reactive to light. Conjunctivae/corneas clear. Neck: Supple, with full range of motion. No jugular venous distention. Trachea midline. Respiratory:  Normal respiratory effort. Clear to auscultation, bilaterally without RALES/WHEEZES/Rhonchi. Cardiovascular: Regular rate and rhythm with normal S1/S2 without MURMURS, rubs or gallops. Abdomen: Soft, non-tender, non-distended with normal bowel sounds. Musculoskeletal: No clubbing, cyanosis or EDEMA bilaterally. Full range of motion without deformity. Skin: Skin color, texture, turgor normal.  No rashes or lesions. Neurologic:  Neurovascularly intact without any focal sensory/motor deficits.  Cranial nerves: II-XII intact, grossly non-focal.  Psychiatric: Alert and oriented, thought content appropriate, normal insight  No change in physical exam from 5/29        Labs:   Recent Labs     05/28/19  0837 05/29/19  0607   WBC 8.0 8.9   HGB 15.8 15.1   HCT 47.6 45.2    244     Recent Labs     05/28/19  0837 05/28/19  0840 05/29/19  0607     --  141   K 4.3  --  3.6     --  106   CO2 19* 24 23   BUN 15  --  15   CREATININE 0.7* 0.8 0.7*   CALCIUM 8.9  --  9.1     Recent Labs     05/29/19  0607   AST 13*   ALT 13   BILIDIR <0.2   BILITOT 0.7   ALKPHOS 75     Recent Labs     05/28/19  0837 05/29/19  0607   INR 0.95 1.11     Recent Labs     05/28/19  0837   TROPONINI <0.01       Urinalysis: Lab Results   Component Value Date    NITRU Negative 05/28/2019    BLOODU Negative 05/28/2019    SPECGRAV <=1.005 05/28/2019    GLUCOSEU Negative 05/28/2019       Radiology:  MRI BRAIN WO CONTRAST   Final Result      1. Small acute ischemic insults in the right cerebellar hemisphere. The ischemic insult in the superior medial right cerebellum demonstrates associated petechial hemorrhagic change. 2. 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 No visualized acute vessel occlusion. Scattered areas of nonhemodynamically significant stenosis of the cervical arteries as detailed above. 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 No visualized acute vessel occlusion. Scattered areas of nonhemodynamically significant stenosis of the cervical arteries as detailed above. CT Head WO Contrast   Final Result      Age-related changes in the brain without acute intracranial abnormality.             VL Extremity Venous Bilateral    (Results Pending)       Assessment/Plan:    Active Hospital Problems    Diagnosis Date Noted    TIA (transient ischemic attack) [G45.9] 05/28/2019       Acute Medical Issues Being Addressed:    40-year-old gentleman admitted to the hospital with speech and gait difficulties     Dysarthria and gait abnormalities secondary to multiple embolic cerebellar infarcts  CT angiogram of the head and neck negative  Strick intake and output  Aspirin and statin  MRI of the brain showed multiple cerebellar infarcts with one area of hemorrhagic conversion as well  Neurology increased dose of aspirin  Echocardiogram showed normal ejection fraction however showed a PFO/ASD  Venous Dopplers of lower extremities  Cardiology consult         Type 2 diabetes mellitus  Continue Lantus  Sliding-scale insulin     Hyperlipidemia  Continue statin    Monitor arranged for will be mailed in       DVT Prophylaxis: sc lovenox   Diet: DIET CARDIAC;  Code Status: Full Code      Dispo - once acute medical processes have resolved    Tomy Baltazar MD

## 2019-05-30 NOTE — PROGRESS NOTES
VSS. Pt is alert and oriented. Pt was up and ambulated in the room x 1 person assist. NIH scale a 0. Will continue to monitor.

## 2019-05-30 NOTE — PROGRESS NOTES
Occupational Therapy   Occupational Therapy Initial Assessment/Tx Note  Date: 2019   Patient Name: Chung Ray  MRN: [id number]     : 10/27/1928    Date of Service: 2019  Assessment: Pt's balance has improved since admit. However, he does require walker, which he does not typically need. Currently, pt needs SBA for majority of functional mobility and ADLs. Recommend initial 24 hr spvn. Discharge Recommendations: Chung Ray scored a 21/24 on the AM-PAC ADL Inpatient form. Current research shows that an AM-PAC score of 18 or greater is typically associated with a discharge to the patient's home setting. OT Equipment Recommendations  Equipment Needed: No    Assessment   Performance deficits / Impairments: Decreased functional mobility ; Decreased ADL status; Decreased endurance;Decreased balance;Decreased high-level IADLs  Assessment: Pt's balance has improved since admit. However, he does require walker, which he does not typically need. Currently, pt needs SBA for majority of functional mobility and ADLs. Recommend initial 24 hr spvn. Treatment Diagnosis: Decreased activity tolerance, impaired ADLs and mobility  Decision Making: Low Complexity  Patient Education: Role of OT, d/c planning, activity promotion - verb understanding  REQUIRES OT FOLLOW UP: Yes  Activity Tolerance  Activity Tolerance: Patient Tolerated treatment well  Safety Devices  Safety Devices in place: Yes  Type of devices: Call light within reach; Left in chair;Nurse notified; Chair alarm in place         Treatment Diagnosis: Decreased activity tolerance, impaired ADLs and mobility      Restrictions  Position Activity Restriction  Other position/activity restrictions: up as tolerated    Subjective   General  Chart Reviewed: Yes  Additional Pertinent Hx: 80 y.o. M admitted  with impaired balance. MRI brain - Small acute ischemic insults in the right cerebellar hemisphere.  PMHx includes DM, HLD, tremor  Family / Caregiver Present: Yes(son)  Referring Practitioner: Magad Cheney  Diagnosis: CVA  Subjective  Subjective: Pt in chair on entry. Very pleasant and cooperative. Pt and son agree balance is significantly better than prior to admit. Pain Assessment  Pain Level: 0  Social/Functional History  Social/Functional History  Lives With: Alone  Type of Home: Apartment([facility])  Home Layout: One level  Home Access: (1 NORMA from garage)  Bathroom Shower/Tub: Walk-in shower  Bathroom Toilet: Standard  Bathroom Equipment: Grab bars in shower, Shower chair, Hand-held shower, Grab bars around toilet(pull cords in bathroom)  Home Equipment: 4 wheeled walker  ADL Assistance: [address]: Needs assistance(cleaning done 2x/month, does own laundry(in apartment), goes to dining room for dinner - breakfast and lunch on own)  Ambulation Assistance: Needs assistance(without AD)  Transfer Assistance: Needs assistance  Active : Yes  Leisure & Hobbies: read, plays on computer, walks, going to nature center  Additional Comments: Denies falls. Reports had trouble with walking, coordination and speech PTA. Did not notice any numbness/tingling or weakness. Feels like symptoms he came in with are resolving. Has 2 sons who both live out of town - one currently here visiting but will likely be going home soon. Objective         Orientation  Overall Orientation Status: Within Functional Limits     Balance  Sitting Balance: Independent  Standing Balance: Stand by assistance  Standing Balance  Time: 7 min + 5 min   Activity: functional mobility in room, bathroom, logan; ADLs  Comment: SBA gait and transfers. spvn static stance. Trialed gait without walker - SBA to CGA, slow, cautious. Improved with walker - SBA to spvn. Pt agreeable with using walker initially at home.    Toilet Transfers  Toilet - Technique: Ambulating  Equipment Used: Standard toilet  Toilet Transfer: Stand by assistance  Toilet Transfers Comments: min cues for technique with walker  ADL  Feeding: Independent  Grooming: Independent  LE Dressing: Stand by assistance;Verbal cueing;Setup  Toileting: Stand by assistance  Coordination  Movements Are Fluid And Coordinated: No  Coordination and Movement description: Tremors(baseline)        Transfers  Sit to stand: Supervision  Stand to sit: Supervision  Vision - Basic Assessment  Patient Visual Report: No visual complaint reported. Cognition  Overall Cognitive Status: WFL  Perception  Overall Perceptual Status: WFL     Sensation  Overall Sensation Status: WFL            Plan  - If pt discharges prior to next tx, this note will serve as d/c summary.  Continue per POC if pt does not d/c.     Plan  Times per week: 2-5x  Times per day: Daily  Current Treatment Recommendations: Balance Training, Functional Mobility Training, Safety Education & Training, Self-Care / ADL    AM-PAC Score  AM-[facility] Inpatient Daily Activity Raw Score: 21  AM-PAC Inpatient ADL T-Scale Score : 44.27  ADL Inpatient CMS 0-100% Score: 32.79  ADL Inpatient CMS G-Code Modifier : CJ    Goals  Short term goals  Time Frame for Short term goals: by D/C  Short term goal 1: Functional mobility for ADLs/item retrieval spvn - Not met  Short term goal 2: Dynamic stance spvn for self care - Not met  Short term goal 3: Complete toileting and toilet transfer with spvn - Not met  Patient Goals   Patient goals : to go home, return to baseline       Therapy Time   Individual Concurrent Group Co-treatment   Time In 1450         Time Out 1530         Minutes 40          Timed Code Tx Min: 25  Total Tx Time: Conor 49, OT

## 2019-05-30 NOTE — PROGRESS NOTES
Speech Language Pathology  Facility/Department: [facility] 5T ORTHO/NEURO  Dysphagia Daily Treatment Note/DC    NAME: Caleb Montoya  : 10/27/1928  MRN: [id number]    Patient Diagnosis(es):   Patient Active Problem List    Diagnosis Date Noted    TIA (transient ischemic attack) 2019    Ataxia 2017    Chest pain     Gallstones     Abdominal pain 2017     Allergies: Allergies   Allergen Reactions    Cat Hair Extract      Nasal congestion    Tetracyclines & Related Rash       Recent Chest Xray-  Not performed this admission     CT of head 19  Age-related changes in the brain without acute intracranial abnormality.     MRI 19     1. Small acute ischemic insults in the right cerebellar hemisphere. The ischemic insult in the superior medial right cerebellum demonstrates associated petechial hemorrhagic change. 2. 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           Previous MBS - none    Chart reviewed. Medical Diagnosis: TIA  Treatment Diagnosis: dysphagia    BSE Impression 19  Pt reports speech difficulties are resolving. Pt without partial dentures which may be a factor with speech sound production. Pt reports he never eats without his dentures- son is to bring from home today. ROM or oral structures was [facility]. Oral- no anterior spillage or oral residue with any consistency. Mastication was prolonged, but pt typically eats with his dentures. Pharyngeal-Pt demonstrated no s/s aspiration with any consistency presented. Pt able to initiate swallow without difficulty with laryngeal movement observed. Vocal quality remained clear throughout. No coughing, throat clearing or choking observed with any consistency.  Pt consumed 3oz water uninterrupted by cup without difficulty.     MBS results -  Not indicated    Pain: none    Current Diet : regular with thin liquids    Treatment:  Pt seen bedside to address the following goals:  1-The patient will tolerate recommended diet without observed clinical signs of aspiration  5/29:  RN with no concerns for swallowing, pt /son deny problems as well. Pt analyzed with solid texture as well as thin liquids via cup and straw with no overt signs of aspiration. Volume of voice somewhat reduced, which pt and son state is baseline. Pt also reports having post nasal drip and has chronic throat clearing. Goal met, cont one more session to ensure consistency  5/30: RN with no concerns for swallowing/respiratory status. Pt/son state same, no problems noted. Pt analyzed with sips of water via cup/straw with no throat clearing, cough or change in voice. Volume of voice remains low, pt /son again state baseline. No difficulty with mastication of solids. Goal met    2- The pt/family will demonstrate understanding of swallowing recommendations and concerns. 5/28-   The pt was educated to purpose of the visit, anatomy and physiology of the swallow, impact CVA can have on swallowing function, s/s of aspiration, swallowing strategies, diet recommendations and possibility of being made NPO if s/s aspiration emerge. The pt stated comprehension. con't goal  5/29; pt /son educated to purpose of visit, pt just spoke with neuro NP and is aware MRI showed acute stroke. Reviewed s/s of aspiration and concern if aspiration occurs. Instructed to notify staff if any signs emerge. Pt and son stated understanding and agreeable  Goal met, cont to ensure consistency  5/30: pt /son educated again to s/s of aspiration and instruction to notify staff if any emerge, as well as if there are any speech changes. Both stated understanding  Goal met    Speech/language/cognitive screen 5/29/19:  Discussed speech eval with pt, who was somewhat reluctant, as he felt  Speech back to baseline. Pt agreeable to completing the [address] [facility] [place] [place], which pt scored 28/30, which is WNL's.   Speech is clear and free of dysarthria, no word finding noted. Pt using humor, telling jokes and conversing appropriately. Instructed pt to notify staff if any cognitive/speech issues emerge. Patient/Family/Caregiver Education:  As above    Compensatory Strategies:  90 degrees all meals     Plan:  Diet recommendations: cont regular diet/thin liquids- if any s/s of aspiration emerge, or there is respiratory decline, make NPO  And re-refer to SLP. If any further speech changes emerge, please re-refer as well. DC recommendation:no follow up required  Treatment: 12  D/W nursing Claudia  Needs met prior to leaving room, call button in reach. Gabriel Monge M.S./CCC-SLP #[id number]  Pg.  # [phone]

## 2019-05-30 NOTE — CONSULTS
80 y.o. here for speech and gait abnormalities that have since resolved. MRI demonstrated stroke, and there was concern for paradoxical/cardiac embolism after TTE showed an interatrial shunt. The pt is now back to normal. Has no complaints. Past Medical History:   Diagnosis Date    Diabetes mellitus ([address]     Hyperlipidemia     Tremor      History reviewed. No pertinent surgical history. Social History     Tobacco Use    Smoking status: Never Smoker    Smokeless tobacco: Never Used   Substance Use Topics    Alcohol use: No    Drug use: No     Allergies   Allergen Reactions    Cat Hair Extract      Nasal congestion    Tetracyclines & Related Rash     Family History   Problem Relation Age of Onset    Diabetes Mother      Review of Systems   General: No fevers, chills, fatigue, or night sweats. No abnormal changes in weight. HEENT: No blurry or decreased vision. No changes in hearing, nasal discharge or sore throat. Cardiovascular: See HPI. Respiratory: No cough, hemoptysis, or wheezing. Gastrointestinal: No abdominal pain, hematochezia, melana, or history of GI ulcers. Genito-Urinary: No dysuria or hematuria. No urgency or polyuria. Musculoskeletal: No complaints of joint pain, joint swelling or muscular weakness/soreness. Neurological: No dizziness or headaches. No numbness/tingling, speech problems or weakness. Psychological: No anxiety or depression  Hematological and Lymphatic: No abnormal bleeding or bruising, blood clots, jaundice. Endocrine: No malaise/lethargy, palpitations, polydipsia/polyuria, temperature intolerance or unexpected weight changes. Skin: No rashes or non-healing ulcers. PE:  Blood pressure 135/85, pulse 80, temperature 97.5 °F (36.4 °C), temperature source Oral, resp. rate 16, height 6' 1\" (1.854 m), weight 183 lb (83 kg), SpO2 93 %. General (appearance): Well developed. No distress. Eyes: Anicteric. EOMI. Neck: Supple.  No jvd  Ears/Nose/Mouth/Thorat: No cyanosis  CV: RRR. No m/r/g   Respiratory:  Clear B, normal respiratory effort  GI: Abd s/nt/nd. No peritoneal signs  Skin: Warm, dry. No rashes  Neuro/Psych: Alert and oriented x 3. Appropriate behavior  Ext:  No c/c. No pitting edema  Pulses:  2+ carotid. No bruits    Lab Results   Component Value Date    WBC 8.9 05/29/2019    HGB 15.1 05/29/2019    HCT 45.2 05/29/2019    MCV 92.2 05/29/2019     05/29/2019     Lab Results   Component Value Date     05/29/2019    K 3.6 05/29/2019     05/29/2019    CO2 23 05/29/2019    BUN 15 05/29/2019    CREATININE 0.7 (L) 05/29/2019    GLUCOSE 97 05/29/2019    CALCIUM 9.1 05/29/2019    PROT 6.2 (L) 05/29/2019    LABALBU 4.1 05/29/2019    BILITOT 0.7 05/29/2019    ALKPHOS 75 05/29/2019    AST 13 (L) 05/29/2019    ALT 13 05/29/2019    LABGLOM >60 05/29/2019    GFRAA >60 05/29/2019    AGRATIO 2.0 05/29/2019    GLOB 2.1 05/29/2019       Lab Results   Component Value Date    INR 1.11 05/29/2019    INR 0.95 05/28/2019    INR 0.99 12/22/2017    PROTIME 12.6 05/29/2019    PROTIME 10.8 05/28/2019    PROTIME 11.2 12/22/2017 5/30/2019 TTE: EF 50%. MAC. Ao sclerosis. + bubble study. RADHA.    5/30/2019 Venous Duplex: Pending    5/28/2019 MRI Brain:  1. Small acute ischemic insults in the right cerebellar hemisphere. The ischemic insult in the superior medial right cerebellum demonstrates associated petechial hemorrhagic change. 2. 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       No visualized acute vessel occlusion.       Scattered areas of nonhemodynamically significant stenosis of the cervical arteries     Prelim venous duplex neg for dvt    A/P:  80 y.o. admitted with a stroke.  Presented with dysarthria and gait abnl  -Stroke  -PFO  -Hyperlipidemia    Recs:  -AGUSTÍN tomorrow (look at the CARY and valves, not as concerned as much with the PFO at this time)  -Agree with venous duplex  -Would not rec closure of PFO in this 79 yo patient  -ASA and statin for now.  -If CARY clot, will need anticoagulation, when safe. Otherwise, get 30 day monitor and f/u with Tamiko Hutson in EP for consideration of ILR. NPO p MN.  Jacobo Holcomb to do AGUSTÍN tomorrow. Thank you for the consult.

## 2019-05-31 ENCOUNTER — APPOINTMENT (OUTPATIENT)
Dept: NON INVASIVE DIAGNOSTICS | Age: 84
DRG: 065 | End: 2019-05-31
Payer: MEDICARE

## 2019-05-31 ENCOUNTER — TELEPHONE (OUTPATIENT)
Dept: CARDIOLOGY CLINIC | Age: 84
End: 2019-05-31

## 2019-05-31 VITALS
HEART RATE: 72 BPM | WEIGHT: 183 LBS | DIASTOLIC BLOOD PRESSURE: 79 MMHG | HEIGHT: 73 IN | TEMPERATURE: 97.5 F | RESPIRATION RATE: 18 BRPM | SYSTOLIC BLOOD PRESSURE: 129 MMHG | OXYGEN SATURATION: 91 % | BODY MASS INDEX: 24.25 KG/M2

## 2019-05-31 LAB
GLUCOSE BLD-MCNC: 92 MG/DL (ref 70–99)
GLUCOSE BLD-MCNC: 95 MG/DL (ref 70–99)
PERFORMED ON: NORMAL
PERFORMED ON: NORMAL

## 2019-05-31 PROCEDURE — 99232 SBSQ HOSP IP/OBS MODERATE 35: CPT | Performed by: NURSE PRACTITIONER

## 2019-05-31 PROCEDURE — 6360000002 HC RX W HCPCS: Performed by: INTERNAL MEDICINE

## 2019-05-31 PROCEDURE — 6370000000 HC RX 637 (ALT 250 FOR IP): Performed by: INTERNAL MEDICINE

## 2019-05-31 PROCEDURE — 2580000003 HC RX 258: Performed by: INTERNAL MEDICINE

## 2019-05-31 PROCEDURE — 6370000000 HC RX 637 (ALT 250 FOR IP): Performed by: NURSE PRACTITIONER

## 2019-05-31 RX ADMIN — ASPIRIN 325 MG: 325 TABLET, DELAYED RELEASE ORAL at 08:17

## 2019-05-31 RX ADMIN — Medication 10 ML: at 08:18

## 2019-05-31 RX ADMIN — ENOXAPARIN SODIUM 40 MG: 40 INJECTION SUBCUTANEOUS at 08:17

## 2019-05-31 RX ADMIN — GABAPENTIN 300 MG: 300 CAPSULE ORAL at 13:49

## 2019-05-31 RX ADMIN — GABAPENTIN 300 MG: 300 CAPSULE ORAL at 08:17

## 2019-05-31 ASSESSMENT — PAIN SCALES - GENERAL: PAINLEVEL_OUTOF10: 0

## 2019-05-31 NOTE — DISCHARGE SUMMARY
Physician Discharge Summary     Patient ID:  Gay Adair  [id number]  57 y.o.  10/27/1928    Admit date: 5/28/2019    Discharge date and time: No discharge date for patient encounter. Admitting Physician: Ivis Swain MD     Discharge Physician: Ivis Swain M.D.     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  Patent foramen ovale      Admission Condition: good    Discharged Condition: good    Indication for Admission: gait distrubances    Hospital Course:   40-year-old gentleman admitted to the hospital with speech and gait difficulties     Dysarthria and gait abnormalities secondary to multiple embolic cerebellar infarcts  CT angiogram of the head and neck negative  Aspirin and statin  MRI of the brain showed multiple cerebellar infarcts with one area of hemorrhagic conversion as well  Neurology increased dose of aspirin  Echocardiogram showed normal ejection fraction however showed a PFO/ASD  Venous Dopplers of lower extremities did not show any DVT  Cardiology over consulted who felt that there was no need for intervening on the patent foramen ovale however they tried doing that transesophageal echocardiogram but was unsuccessful  I will arrange for a 30 day monitor to be sent to his home  For now will continue with the statin and high-dose aspirin  All of this discussed with the patient voiced understanding               Consults: cardiology and neurology        Discharge Exam:  /79   Pulse 72   Temp 97.5 °F (36.4 °C) (Oral)   Resp 18   Ht 6' 1\" (1.854 m)   Wt 183 lb (83 kg)   SpO2 91%   BMI 24.14 kg/m²   General appearance: alert, appears stated age and cooperative  Lungs: clear to auscultation bilaterally  Heart: regular rate and rhythm, S1, S2 normal, no murmur, click, rub or gallop  Abdomen: soft, non-tender; bowel sounds normal; no masses,  no organomegaly  Neurologic: Grossly normal    Disposition: home    In process/preliminary results:  Outstanding Order Results     No orders found from 4/29/2019 to 5/29/2019. Patient Instructions:   Current Discharge Medication List      START taking these medications    Details   aspirin 325 MG EC tablet Take 1 tablet by mouth daily  Qty: 30 tablet, Refills: 3         CONTINUE these medications which have NOT CHANGED    Details   gabapentin (NEURONTIN) 300 MG capsule Take 300 mg by mouth 2 times daily. Indications: breakfast and lunch      gabapentin (NEURONTIN) 600 MG tablet Take 600 mg by mouth nightly. metFORMIN (GLUCOPHAGE) 1000 MG tablet Take 2,000 mg by mouth nightly      Insulin Detemir (LEVEMIR FLEXPEN SC) Inject 30 Units into the skin nightly      Misc.  Devices (ROLLER Rypos) MISC 1 each by Does not apply route daily  Qty: 1 each, Refills: 0      Exenatide (BYDUREON) 2 MG PEN Inject 2 mg into the skin every 7 days       atorvastatin (LIPITOR) 40 MG tablet Take 40 mg by mouth every other day         STOP taking these medications       aspirin 81 MG tablet Comments:   Reason for Stopping:             Activity: activity as tolerated  Diet: cardiac diet  Wound Care: none needed    Time spent 40 mins    Signed:  Jan Lombardi MD  5/31/2019  12:39 PM

## 2019-05-31 NOTE — CARE COORDINATION
[facility]    Spoke with patient regarding [facility] services. Patient aware and refuses home care at this time. Patient prefers to walk to PT/OT at [facility].       Mia Whitehead LPN  Care Transition Nurse  [address]  [phone]

## 2019-05-31 NOTE — PROGRESS NOTES
Pt alert and oriented. VSS. Pt made aware of being NPO after midnight for testing tomorrow, pt verbalizes understanding. Pt blood sugar for the evening was 119. Notified MD regarding Lantus, was ordered to hold for tonight due to NPO status. Pt had peanut butter crackers prior to midnight. Will continue to monitor and follow POC.

## 2019-05-31 NOTE — PROGRESS NOTES
Discharge order received. Discharge instructions covered with patient and son, all questions answered. PIV removed with no complications. Patient states understanding of all discharge instructions. Patient wheeled to son's car with all belongings.

## 2019-05-31 NOTE — PROGRESS NOTES
petechial hemorrhagic change. 2. Mild chronic small vessel ischemic white matter disease with small remote infarct in the left cerebellum           Bilateral lower extremity doppler studies:  Right Impression   No evidence of deep vein or superficial vein thrombosis involving the right   lower extremity. Left Impression   No evidence of deep vein or superficial vein thrombosis involving the left   lower extremity         Normal venous duplex study of the bilateral lower extremities. There is no    evidence of deep or superficial venous thrombosis.         TTE  - EF 50%  - LA normal size,  A bubble study was performed and shows evidence of right to   left shunting consistent with a patent foramen ovale or atrial septal   defect     CTA head and neck:  High-grade atherosclerotic irregularity of the petrous left carotid artery again noted.       No visualized acute vessel occlusion.       Scattered areas of nonhemodynamically significant stenosis of the cervical arteries as detailed above       CT head  Age-related changes in the brain without acute intracranial abnormality.           Impression:  1. Acute bi-cerebellar cerebral infarctions  2. DM  3. HLD  4. PFO    Thomas Tyler is a 80 y.o. male who presented with difficulty walking with bi-cerebellar cerebral infarctions on MRI of the brain, embolic in nature.      TTE remarkable for PFO. Not a candidate for closure given age per cardiology. Lower extremity doppler studies unremarkable for thrombus. AGUSTÍN unable to be completed. Unable to pass scope down.      Recommendations:  - Agree with plans for 30 day event monitor and outpatient f/u with EP for consideration of loop recorder placement  - Continue aspirin and statin for secondary stroke prevention  - Maintain good secondary prevention, LDL goal 70 and below, A1C goal 7.0 and below, BP goal 140/90 and below, however this should be achieved over the next few days  -  PT/OT, rehab as able    A copy of this note was provided for MD Jason Jackson [address] Neurology

## 2019-05-31 NOTE — PROGRESS NOTES
S: Pt seen prior to AGUSTÍN. He denied cp or sob. Tele:    O:  Physical Exam:  BP (!) 147/76   Pulse 66   Temp 97.7 °F (36.5 °C) (Oral)   Resp 16   Ht 6' 1\" (1.854 m)   Wt 183 lb (83 kg)   SpO2 93%   BMI 24.14 kg/m²    General (appearance):  No acute distress  Eyes: anicteric   Neck: soft, No JVD  Ears/Nose/Mouth/Thorat: No cyanosis  CV: RRR   Respiratory:  Clear  GI: soft, non-tender, non-distended  Skin: Warm, dry. No rashes  Neuro/Psych: Alert and oriented x 3. Appropriate behavior  Ext:  No c/c. No edema  Pulses:  2+ carotid      Weight  Admission: Weight: 192 lb (87.1 kg)   Today: Weight: 183 lb (83 kg)    CBC:   Recent Labs     19  0607   WBC 8.9   HGB 15.1   HCT 45.2   MCV 92.2        BMP:   Recent Labs     19  0607      K 3.6      CO2 23   BUN 15   CREATININE 0.7*     Mag:   Lab Results   Component Value Date    MG 1.80 2019     LIVER PROFILE:   Recent Labs     19  0607   AST 13*   ALT 13   BILIDIR <0.2   BILITOT 0.7   ALKPHOS 75     PT/INR:   Recent Labs     19  0607   PROTIME 12.6   INR 1.11   LIPIDS:   Lab Results   Component Value Date    CHOL 113 2019     Lab Results   Component Value Date    TRIG 112 2019     Lab Results   Component Value Date    HDL 37 (L) 2019     Lab Results   Component Value Date    LDLCALC 54 2019     Lab Results   Component Value Date    LABVLDL 22 2019     No results found for: CHOLHDLRATIO    Imagin2019 ECG: BLE venous duplex: Negative DVT Bilaterally    2019 TTE: EF 50%. MAC. Ao sclerosis. + bubble study. RADHA.     2019 Venous Duplex: Pending     2019 MRI Brain:  1. Small acute ischemic insults in the right cerebellar hemisphere. The ischemic insult in the superior medial right cerebellum demonstrates associated petechial hemorrhagic change.    2. 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      19 CTA Neck/Brain:  High-grade atherosclerotic irregularity of the petrous left carotid artery again noted.       No visualized acute vessel occlusion.       Scattered areas of nonhemodynamically significant stenosis of the cervical arteries      Prelim venous duplex neg for dvt    Assessment:  80 y.o. admitted with a stroke. Presented with dysarthria and gait abnl  -Stroke  -PFO  -Hyperlipidemia    Plan:  -ASA, statin  -AGUSTÍN today; If CARY clot, will need anticoagulation, when safe. Otherwise, get 30 day monitor and f/u with Roxy Suggs in EP for consideration of ILR.

## 2019-07-11 ENCOUNTER — TELEPHONE (OUTPATIENT)
Dept: CARDIOLOGY CLINIC | Age: 84
End: 2019-07-11

## 2019-07-11 DIAGNOSIS — G45.9 TIA (TRANSIENT ISCHEMIC ATTACK): ICD-10-CM

## 2019-07-11 PROCEDURE — 93228 REMOTE 30 DAY ECG REV/REPORT: CPT | Performed by: INTERNAL MEDICINE

## 2020-10-30 ENCOUNTER — APPOINTMENT (OUTPATIENT)
Dept: CT IMAGING | Age: 85
DRG: 074 | End: 2020-10-30
Payer: MEDICARE

## 2020-10-30 ENCOUNTER — HOSPITAL ENCOUNTER (INPATIENT)
Age: 85
LOS: 5 days | Discharge: NURSING, REHAB OR LONG-TERM CARE | DRG: 074 | End: 2020-11-04
Attending: EMERGENCY MEDICINE | Admitting: INTERNAL MEDICINE
Payer: MEDICARE

## 2020-10-30 ENCOUNTER — APPOINTMENT (OUTPATIENT)
Dept: GENERAL RADIOLOGY | Age: 85
DRG: 074 | End: 2020-10-30
Payer: MEDICARE

## 2020-10-30 PROBLEM — R29.6 FALLS FREQUENTLY: Status: ACTIVE | Noted: 2020-10-30

## 2020-10-30 LAB
ANION GAP SERPL CALCULATED.3IONS-SCNC: 12 MMOL/L (ref 3–16)
BASOPHILS ABSOLUTE: 0 K/UL (ref 0–0.2)
BASOPHILS RELATIVE PERCENT: 0.4 %
BILIRUBIN URINE: NEGATIVE
BLOOD, URINE: NEGATIVE
BUN BLDV-MCNC: 10 MG/DL (ref 7–20)
CALCIUM SERPL-MCNC: 9 MG/DL (ref 8.3–10.6)
CHLORIDE BLD-SCNC: 102 MMOL/L (ref 99–110)
CLARITY: CLEAR
CO2: 23 MMOL/L (ref 21–32)
COLOR: YELLOW
CREAT SERPL-MCNC: 0.6 MG/DL (ref 0.8–1.3)
EKG ATRIAL RATE: 97 BPM
EKG DIAGNOSIS: NORMAL
EKG P AXIS: 63 DEGREES
EKG P-R INTERVAL: 262 MS
EKG Q-T INTERVAL: 336 MS
EKG QRS DURATION: 94 MS
EKG QTC CALCULATION (BAZETT): 426 MS
EKG R AXIS: 62 DEGREES
EKG T AXIS: 185 DEGREES
EKG VENTRICULAR RATE: 97 BPM
EOSINOPHILS ABSOLUTE: 0.1 K/UL (ref 0–0.6)
EOSINOPHILS RELATIVE PERCENT: 1.1 %
GFR AFRICAN AMERICAN: >60
GFR NON-AFRICAN AMERICAN: >60
GLUCOSE BLD-MCNC: 119 MG/DL (ref 70–99)
GLUCOSE BLD-MCNC: 162 MG/DL (ref 70–99)
GLUCOSE URINE: NEGATIVE MG/DL
HCT VFR BLD CALC: 45.9 % (ref 40.5–52.5)
HEMOGLOBIN: 15.1 G/DL (ref 13.5–17.5)
KETONES, URINE: 15 MG/DL
LEUKOCYTE ESTERASE, URINE: NEGATIVE
LYMPHOCYTES ABSOLUTE: 0.9 K/UL (ref 1–5.1)
LYMPHOCYTES RELATIVE PERCENT: 8.1 %
MCH RBC QN AUTO: 31.1 PG (ref 26–34)
MCHC RBC AUTO-ENTMCNC: 32.8 G/DL (ref 31–36)
MCV RBC AUTO: 94.8 FL (ref 80–100)
MICROSCOPIC EXAMINATION: ABNORMAL
MONOCYTES ABSOLUTE: 0.8 K/UL (ref 0–1.3)
MONOCYTES RELATIVE PERCENT: 7.3 %
NEUTROPHILS ABSOLUTE: 9.6 K/UL (ref 1.7–7.7)
NEUTROPHILS RELATIVE PERCENT: 83.1 %
NITRITE, URINE: NEGATIVE
PDW BLD-RTO: 13.3 % (ref 12.4–15.4)
PERFORMED ON: ABNORMAL
PH UA: 7.5 (ref 5–8)
PLATELET # BLD: 242 K/UL (ref 135–450)
PMV BLD AUTO: 8.7 FL (ref 5–10.5)
POTASSIUM REFLEX MAGNESIUM: 4.4 MMOL/L (ref 3.5–5.1)
PRO-BNP: 158 PG/ML (ref 0–449)
PROTEIN UA: NEGATIVE MG/DL
RBC # BLD: 4.84 M/UL (ref 4.2–5.9)
SODIUM BLD-SCNC: 137 MMOL/L (ref 136–145)
SPECIFIC GRAVITY UA: 1.02 (ref 1–1.03)
TROPONIN: <0.01 NG/ML
URINE TYPE: ABNORMAL
UROBILINOGEN, URINE: 0.2 E.U./DL
WBC # BLD: 11.6 K/UL (ref 4–11)

## 2020-10-30 PROCEDURE — 70450 CT HEAD/BRAIN W/O DYE: CPT

## 2020-10-30 PROCEDURE — 83880 ASSAY OF NATRIURETIC PEPTIDE: CPT

## 2020-10-30 PROCEDURE — 80048 BASIC METABOLIC PNL TOTAL CA: CPT

## 2020-10-30 PROCEDURE — 2580000003 HC RX 258: Performed by: INTERNAL MEDICINE

## 2020-10-30 PROCEDURE — 99285 EMERGENCY DEPT VISIT HI MDM: CPT

## 2020-10-30 PROCEDURE — 71046 X-RAY EXAM CHEST 2 VIEWS: CPT

## 2020-10-30 PROCEDURE — 81003 URINALYSIS AUTO W/O SCOPE: CPT

## 2020-10-30 PROCEDURE — 6370000000 HC RX 637 (ALT 250 FOR IP): Performed by: PHYSICIAN ASSISTANT

## 2020-10-30 PROCEDURE — 93005 ELECTROCARDIOGRAM TRACING: CPT | Performed by: PHYSICIAN ASSISTANT

## 2020-10-30 PROCEDURE — 6370000000 HC RX 637 (ALT 250 FOR IP): Performed by: INTERNAL MEDICINE

## 2020-10-30 PROCEDURE — 1200000000 HC SEMI PRIVATE

## 2020-10-30 PROCEDURE — 85025 COMPLETE CBC W/AUTO DIFF WBC: CPT

## 2020-10-30 PROCEDURE — 72193 CT PELVIS W/DYE: CPT

## 2020-10-30 PROCEDURE — 73502 X-RAY EXAM HIP UNI 2-3 VIEWS: CPT

## 2020-10-30 PROCEDURE — 36415 COLL VENOUS BLD VENIPUNCTURE: CPT

## 2020-10-30 PROCEDURE — 84484 ASSAY OF TROPONIN QUANT: CPT

## 2020-10-30 PROCEDURE — 6360000004 HC RX CONTRAST MEDICATION: Performed by: PHYSICIAN ASSISTANT

## 2020-10-30 RX ORDER — ASCORBIC ACID 500 MG
1000 TABLET ORAL DAILY
COMMUNITY

## 2020-10-30 RX ORDER — GABAPENTIN 300 MG/1
300 CAPSULE ORAL DAILY
Status: DISCONTINUED | OUTPATIENT
Start: 2020-10-31 | End: 2020-11-04 | Stop reason: HOSPADM

## 2020-10-30 RX ORDER — SODIUM CHLORIDE 0.9 % (FLUSH) 0.9 %
10 SYRINGE (ML) INJECTION PRN
Status: DISCONTINUED | OUTPATIENT
Start: 2020-10-30 | End: 2020-11-04 | Stop reason: HOSPADM

## 2020-10-30 RX ORDER — ACETAMINOPHEN 325 MG/1
650 TABLET ORAL EVERY 6 HOURS PRN
Status: DISCONTINUED | OUTPATIENT
Start: 2020-10-30 | End: 2020-10-31

## 2020-10-30 RX ORDER — LIDOCAINE 4 G/G
1 PATCH TOPICAL ONCE
Status: COMPLETED | OUTPATIENT
Start: 2020-10-30 | End: 2020-10-31

## 2020-10-30 RX ORDER — DEXTROSE MONOHYDRATE 25 G/50ML
12.5 INJECTION, SOLUTION INTRAVENOUS PRN
Status: DISCONTINUED | OUTPATIENT
Start: 2020-10-30 | End: 2020-11-04 | Stop reason: HOSPADM

## 2020-10-30 RX ORDER — MULTIVIT-MIN/IRON/FOLIC ACID/K 18-600-40
1 CAPSULE ORAL
COMMUNITY

## 2020-10-30 RX ORDER — INSULIN LISPRO 100 [IU]/ML
0-6 INJECTION, SOLUTION INTRAVENOUS; SUBCUTANEOUS NIGHTLY
Status: DISCONTINUED | OUTPATIENT
Start: 2020-10-30 | End: 2020-11-04 | Stop reason: HOSPADM

## 2020-10-30 RX ORDER — PROMETHAZINE HYDROCHLORIDE 12.5 MG/1
12.5 TABLET ORAL EVERY 6 HOURS PRN
Status: DISCONTINUED | OUTPATIENT
Start: 2020-10-30 | End: 2020-11-04 | Stop reason: HOSPADM

## 2020-10-30 RX ORDER — NICOTINE POLACRILEX 4 MG
15 LOZENGE BUCCAL PRN
Status: DISCONTINUED | OUTPATIENT
Start: 2020-10-30 | End: 2020-11-04 | Stop reason: HOSPADM

## 2020-10-30 RX ORDER — LIDOCAINE 4 G/G
1 PATCH TOPICAL DAILY
Status: DISCONTINUED | OUTPATIENT
Start: 2020-10-31 | End: 2020-11-04 | Stop reason: HOSPADM

## 2020-10-30 RX ORDER — ONDANSETRON 2 MG/ML
4 INJECTION INTRAMUSCULAR; INTRAVENOUS EVERY 6 HOURS PRN
Status: DISCONTINUED | OUTPATIENT
Start: 2020-10-30 | End: 2020-11-04 | Stop reason: HOSPADM

## 2020-10-30 RX ORDER — GABAPENTIN 300 MG/1
300 CAPSULE ORAL
Status: DISCONTINUED | OUTPATIENT
Start: 2020-10-31 | End: 2020-11-04 | Stop reason: HOSPADM

## 2020-10-30 RX ORDER — INSULIN LISPRO 100 [IU]/ML
0-12 INJECTION, SOLUTION INTRAVENOUS; SUBCUTANEOUS
Status: DISCONTINUED | OUTPATIENT
Start: 2020-10-30 | End: 2020-11-04 | Stop reason: HOSPADM

## 2020-10-30 RX ORDER — SODIUM CHLORIDE 0.9 % (FLUSH) 0.9 %
10 SYRINGE (ML) INJECTION EVERY 12 HOURS SCHEDULED
Status: DISCONTINUED | OUTPATIENT
Start: 2020-10-30 | End: 2020-11-04 | Stop reason: HOSPADM

## 2020-10-30 RX ORDER — POLYETHYLENE GLYCOL 3350 17 G/17G
17 POWDER, FOR SOLUTION ORAL DAILY PRN
Status: DISCONTINUED | OUTPATIENT
Start: 2020-10-30 | End: 2020-11-04 | Stop reason: HOSPADM

## 2020-10-30 RX ORDER — METFORMIN HYDROCHLORIDE 500 MG/1
2000 TABLET, EXTENDED RELEASE ORAL NIGHTLY
COMMUNITY

## 2020-10-30 RX ORDER — TRAMADOL HYDROCHLORIDE 50 MG/1
50 TABLET ORAL EVERY 6 HOURS PRN
Status: DISCONTINUED | OUTPATIENT
Start: 2020-10-30 | End: 2020-11-04 | Stop reason: HOSPADM

## 2020-10-30 RX ORDER — ACETAMINOPHEN 650 MG/1
650 SUPPOSITORY RECTAL EVERY 6 HOURS PRN
Status: DISCONTINUED | OUTPATIENT
Start: 2020-10-30 | End: 2020-10-31

## 2020-10-30 RX ORDER — ASCORBIC ACID 500 MG
1000 TABLET ORAL DAILY
Status: DISCONTINUED | OUTPATIENT
Start: 2020-10-31 | End: 2020-11-04 | Stop reason: HOSPADM

## 2020-10-30 RX ORDER — ATORVASTATIN CALCIUM 40 MG/1
40 TABLET, FILM COATED ORAL NIGHTLY
Status: DISCONTINUED | OUTPATIENT
Start: 2020-10-30 | End: 2020-11-04 | Stop reason: HOSPADM

## 2020-10-30 RX ORDER — ACETAMINOPHEN 325 MG/1
650 TABLET ORAL ONCE
Status: COMPLETED | OUTPATIENT
Start: 2020-10-30 | End: 2020-10-30

## 2020-10-30 RX ORDER — GABAPENTIN 600 MG/1
600 TABLET ORAL NIGHTLY
Status: DISCONTINUED | OUTPATIENT
Start: 2020-10-30 | End: 2020-11-04 | Stop reason: HOSPADM

## 2020-10-30 RX ORDER — ASPIRIN 81 MG/1
81 TABLET ORAL DAILY
Status: DISCONTINUED | OUTPATIENT
Start: 2020-10-31 | End: 2020-11-04 | Stop reason: HOSPADM

## 2020-10-30 RX ORDER — DEXTROSE MONOHYDRATE 50 MG/ML
100 INJECTION, SOLUTION INTRAVENOUS PRN
Status: DISCONTINUED | OUTPATIENT
Start: 2020-10-30 | End: 2020-11-04 | Stop reason: HOSPADM

## 2020-10-30 RX ADMIN — INSULIN GLARGINE 25 UNITS: 100 INJECTION, SOLUTION SUBCUTANEOUS at 22:05

## 2020-10-30 RX ADMIN — Medication 10 ML: at 22:27

## 2020-10-30 RX ADMIN — IOPAMIDOL 80 ML: 755 INJECTION, SOLUTION INTRAVENOUS at 11:22

## 2020-10-30 RX ADMIN — INSULIN LISPRO 1 UNITS: 100 INJECTION, SOLUTION INTRAVENOUS; SUBCUTANEOUS at 22:05

## 2020-10-30 RX ADMIN — ATORVASTATIN CALCIUM 40 MG: 40 TABLET, FILM COATED ORAL at 22:06

## 2020-10-30 RX ADMIN — GABAPENTIN 600 MG: 600 TABLET, FILM COATED ORAL at 22:06

## 2020-10-30 RX ADMIN — ACETAMINOPHEN 650 MG: 325 TABLET ORAL at 16:26

## 2020-10-30 RX ADMIN — TRAMADOL HYDROCHLORIDE 50 MG: 50 TABLET ORAL at 18:33

## 2020-10-30 ASSESSMENT — PAIN SCALES - GENERAL
PAINLEVEL_OUTOF10: 1
PAINLEVEL_OUTOF10: 6
PAINLEVEL_OUTOF10: 0
PAINLEVEL_OUTOF10: 5

## 2020-10-30 ASSESSMENT — PAIN DESCRIPTION - PAIN TYPE
TYPE: ACUTE PAIN
TYPE: ACUTE PAIN

## 2020-10-30 ASSESSMENT — ENCOUNTER SYMPTOMS
COUGH: 0
GASTROINTESTINAL NEGATIVE: 1
ABDOMINAL PAIN: 0
SHORTNESS OF BREATH: 0
BACK PAIN: 0
RESPIRATORY NEGATIVE: 1

## 2020-10-30 ASSESSMENT — PAIN DESCRIPTION - DESCRIPTORS: DESCRIPTORS: ACHING

## 2020-10-30 ASSESSMENT — PAIN DESCRIPTION - ONSET: ONSET: ON-GOING

## 2020-10-30 ASSESSMENT — PAIN DESCRIPTION - LOCATION: LOCATION: BUTTOCKS

## 2020-10-30 ASSESSMENT — PAIN DESCRIPTION - FREQUENCY: FREQUENCY: CONTINUOUS

## 2020-10-30 ASSESSMENT — PAIN DESCRIPTION - PROGRESSION: CLINICAL_PROGRESSION: GRADUALLY WORSENING

## 2020-10-30 ASSESSMENT — PAIN DESCRIPTION - ORIENTATION: ORIENTATION: LEFT

## 2020-10-30 NOTE — ED PROVIDER NOTES
[address] [facility] ENC[facility]          PHYSICIAN ASSISTANT NOTE       Date of evaluation: 10/30/2020    Chief Complaint     Fall and Other (Left butt check injury )      History of Present Illness     Inna Pierce is a 80 y.o. male who presents for fall. Patient arrives via EMS from home. Patient reports he \"just fell\" today. Patient reports he has been feeling \"off\" and having trouble walking intermittently recently, which was worse today. Patient fell onto his buttocks and has pain/swelling to the buttock. Patient reports he has been unable to ambulate since the fall. Patient denies head injury or loss of consciousness. No neck or back pain. Patient is not on blood thinners. No fevers, cough, chest pain, shortness of breath. No vomiting, diarrhea, abdominal pain. Review of Systems     Review of Systems   Constitutional: Negative. Negative for fever. Respiratory: Negative. Negative for cough and shortness of breath. Cardiovascular: Negative. Negative for chest pain. Gastrointestinal: Negative. Negative for abdominal pain. Musculoskeletal: Negative for back pain and neck pain. +left hip pain   Skin: Negative. Neurological: Negative. Negative for weakness and numbness. All other systems reviewed and are negative. Past Medical, Surgical, Family, and Social History     He has a past medical history of Diabetes mellitus ([facility] [address] Hyperlipidemia, and Tremor. He has no past surgical history on file. His family history includes Diabetes in his mother. He reports that he has never smoked. He has never used smokeless tobacco. He reports that he does not drink alcohol or use drugs.     Medications     Previous Medications    ASPIRIN 325 MG EC TABLET    Take 1 tablet by mouth daily    ATORVASTATIN (LIPITOR) 40 MG TABLET    Take 40 mg by mouth every other day    EXENATIDE (BYDUREON) 2 MG PEN    Inject 2 mg into the skin every 7 days     GABAPENTIN (NEURONTIN) 300 MG Straw/Yellow    Clarity, UA Clear Clear    Glucose, Ur Negative Negative mg/dL    Bilirubin Urine Negative Negative    Ketones, Urine 15 (A) Negative mg/dL    Specific Gravity, UA 1.020 1.005 - 1.030    Blood, Urine Negative Negative    pH, UA 7.5 5.0 - 8.0    Protein, UA Negative Negative mg/dL    Urobilinogen, Urine 0.2 <2.0 E.U./dL    Nitrite, Urine Negative Negative    Leukocyte Esterase, Urine Negative Negative    Microscopic Examination Not Indicated     Urine Type NotGiven    EKG 12 Lead   Result Value Ref Range    Ventricular Rate 97 BPM    Atrial Rate 97 BPM    P-R Interval 262 ms    QRS Duration 94 ms    Q-T Interval 336 ms    QTc Calculation (Bazett) 426 ms    P Axis 63 degrees    R Axis 62 degrees    T Axis 185 degrees    Diagnosis       EKG performed in ER and to be interpreted by ER physician. Confirmed by MD, ER ([id number]),  Nataly Woodruff ([id number]) on 10/30/2020 10:21:07 AM       ED BEDSIDE ULTRASOUND:      RECENT VITALS:  BP: (!) 140/90, Temp: 97.6 °F (36.4 °C), Pulse: 103, Resp: 15, SpO2: 93 %     Procedures         ED Course     Nursing Notes, Past Medical Hx,Past Surgical Hx, Social Hx, Allergies, and Family Hx were reviewed. The patient was given the following medications:  Orders Placed This Encounter   Medications    iopamidol (ISOVUE-370) 76 % injection 80 mL    acetaminophen (TYLENOL) tablet 650 mg    lidocaine 4 % external patch 1 patch       CONSULTS:  IP CONSULT TO HOSPITALIST  IP CONSULT TO NEUROLOGY  IP CONSULT TO SOCIAL WORK  IP CONSULT TO [address] [place] [address] / DANIEL / Iraida Catarino is a 80 y.o. male here for fall. Patient is well appearing and in no acute distress. Vitals normal with the exception of mild hypertension. EKG is NSR with first degree AV block, unchanged from prior. CT head shows no acute process. Chest x-ray clear. X-ray hip shows no acute process. CT hip shows no active extravasation. Patient with mild leukocytosis.  Normal electrolytes

## 2020-10-30 NOTE — PROGRESS NOTES
Pt c/o right buttock/hip pain secondary to fall. Hematoma noted at the site, remains open to air. Tramadol given for pain which pt states was very helpful. Resting comfortably in bed at this time.

## 2020-10-30 NOTE — ED PROVIDER NOTES
ED Attending Attestation Note     Date of evaluation: 10/30/2020    This patient was seen by the advance practice provider. I have seen and examined the patient, agree with the workup, evaluation, management and diagnosis. The care plan has been discussed. I have reviewed the ECG and concur with the MICHELET's interpretation. My assessment reveals a 40-year-old  male who presents after a fall. On examination he has a large hematoma over his left gluteal area. Unclear how he came to fall and was not concerning for syncope given no loss of consciousness. He states he has been feeling somewhat off but difficult to assess his gait as he cannot ambulate due to pain from his hematoma. No fracture was noted.      Estefania Velázquez MD  10/30/20 1114

## 2020-10-30 NOTE — ED TRIAGE NOTES
Pt to ER with c\o fall and left butt check injury. Pt states he has been feeling \"off\" for the last couple of weeks.

## 2020-10-31 ENCOUNTER — APPOINTMENT (OUTPATIENT)
Dept: MRI IMAGING | Age: 85
DRG: 074 | End: 2020-10-31
Payer: MEDICARE

## 2020-10-31 LAB
ANION GAP SERPL CALCULATED.3IONS-SCNC: 12 MMOL/L (ref 3–16)
BASOPHILS ABSOLUTE: 0.1 K/UL (ref 0–0.2)
BASOPHILS RELATIVE PERCENT: 0.5 %
BUN BLDV-MCNC: 22 MG/DL (ref 7–20)
CALCIUM SERPL-MCNC: 8.8 MG/DL (ref 8.3–10.6)
CHLORIDE BLD-SCNC: 102 MMOL/L (ref 99–110)
CO2: 23 MMOL/L (ref 21–32)
CREAT SERPL-MCNC: 0.9 MG/DL (ref 0.8–1.3)
EOSINOPHILS ABSOLUTE: 0 K/UL (ref 0–0.6)
EOSINOPHILS RELATIVE PERCENT: 0.2 %
GFR AFRICAN AMERICAN: >60
GFR NON-AFRICAN AMERICAN: >60
GLUCOSE BLD-MCNC: 112 MG/DL (ref 70–99)
GLUCOSE BLD-MCNC: 121 MG/DL (ref 70–99)
GLUCOSE BLD-MCNC: 126 MG/DL (ref 70–99)
GLUCOSE BLD-MCNC: 134 MG/DL (ref 70–99)
GLUCOSE BLD-MCNC: 140 MG/DL (ref 70–99)
HCT VFR BLD CALC: 39 % (ref 40.5–52.5)
HEMOGLOBIN: 13.2 G/DL (ref 13.5–17.5)
LYMPHOCYTES ABSOLUTE: 1.2 K/UL (ref 1–5.1)
LYMPHOCYTES RELATIVE PERCENT: 7.9 %
MCH RBC QN AUTO: 31.3 PG (ref 26–34)
MCHC RBC AUTO-ENTMCNC: 33.7 G/DL (ref 31–36)
MCV RBC AUTO: 92.8 FL (ref 80–100)
MONOCYTES ABSOLUTE: 1.5 K/UL (ref 0–1.3)
MONOCYTES RELATIVE PERCENT: 9.6 %
NEUTROPHILS ABSOLUTE: 12.6 K/UL (ref 1.7–7.7)
NEUTROPHILS RELATIVE PERCENT: 81.8 %
PDW BLD-RTO: 13.3 % (ref 12.4–15.4)
PERFORMED ON: ABNORMAL
PLATELET # BLD: 260 K/UL (ref 135–450)
PMV BLD AUTO: 8.9 FL (ref 5–10.5)
POTASSIUM REFLEX MAGNESIUM: 4 MMOL/L (ref 3.5–5.1)
RBC # BLD: 4.2 M/UL (ref 4.2–5.9)
SODIUM BLD-SCNC: 137 MMOL/L (ref 136–145)
WBC # BLD: 15.4 K/UL (ref 4–11)

## 2020-10-31 PROCEDURE — 2580000003 HC RX 258: Performed by: INTERNAL MEDICINE

## 2020-10-31 PROCEDURE — 1200000000 HC SEMI PRIVATE

## 2020-10-31 PROCEDURE — 70551 MRI BRAIN STEM W/O DYE: CPT

## 2020-10-31 PROCEDURE — 80048 BASIC METABOLIC PNL TOTAL CA: CPT

## 2020-10-31 PROCEDURE — 36415 COLL VENOUS BLD VENIPUNCTURE: CPT

## 2020-10-31 PROCEDURE — 6370000000 HC RX 637 (ALT 250 FOR IP): Performed by: INTERNAL MEDICINE

## 2020-10-31 PROCEDURE — 85025 COMPLETE CBC W/AUTO DIFF WBC: CPT

## 2020-10-31 RX ORDER — PRIMIDONE 50 MG/1
25 TABLET ORAL NIGHTLY
COMMUNITY

## 2020-10-31 RX ORDER — PRIMIDONE 50 MG/1
25 TABLET ORAL NIGHTLY
Status: DISCONTINUED | OUTPATIENT
Start: 2020-10-31 | End: 2020-11-04 | Stop reason: HOSPADM

## 2020-10-31 RX ORDER — ACETAMINOPHEN 500 MG
1000 TABLET ORAL EVERY 8 HOURS SCHEDULED
Status: DISCONTINUED | OUTPATIENT
Start: 2020-10-31 | End: 2020-11-04 | Stop reason: HOSPADM

## 2020-10-31 RX ADMIN — GABAPENTIN 300 MG: 300 CAPSULE ORAL at 12:51

## 2020-10-31 RX ADMIN — PRIMIDONE 25 MG: 50 TABLET ORAL at 22:25

## 2020-10-31 RX ADMIN — ACETAMINOPHEN 1000 MG: 500 TABLET ORAL at 22:17

## 2020-10-31 RX ADMIN — INSULIN LISPRO 1 UNITS: 100 INJECTION, SOLUTION INTRAVENOUS; SUBCUTANEOUS at 22:21

## 2020-10-31 RX ADMIN — Medication 10 ML: at 22:18

## 2020-10-31 RX ADMIN — TRAMADOL HYDROCHLORIDE 50 MG: 50 TABLET ORAL at 00:23

## 2020-10-31 RX ADMIN — OXYCODONE HYDROCHLORIDE AND ACETAMINOPHEN 1000 MG: 500 TABLET ORAL at 08:25

## 2020-10-31 RX ADMIN — Medication 10 ML: at 08:27

## 2020-10-31 RX ADMIN — GABAPENTIN 300 MG: 300 CAPSULE ORAL at 08:25

## 2020-10-31 RX ADMIN — INSULIN GLARGINE 25 UNITS: 100 INJECTION, SOLUTION SUBCUTANEOUS at 22:21

## 2020-10-31 RX ADMIN — ATORVASTATIN CALCIUM 40 MG: 40 TABLET, FILM COATED ORAL at 22:18

## 2020-10-31 RX ADMIN — GABAPENTIN 600 MG: 600 TABLET, FILM COATED ORAL at 22:18

## 2020-10-31 ASSESSMENT — PAIN SCALES - GENERAL
PAINLEVEL_OUTOF10: 0
PAINLEVEL_OUTOF10: 3
PAINLEVEL_OUTOF10: 0
PAINLEVEL_OUTOF10: 5
PAINLEVEL_OUTOF10: 0
PAINLEVEL_OUTOF10: 2

## 2020-10-31 ASSESSMENT — PAIN DESCRIPTION - ONSET
ONSET: ON-GOING
ONSET: ON-GOING

## 2020-10-31 ASSESSMENT — PAIN DESCRIPTION - DESCRIPTORS
DESCRIPTORS: ACHING;DISCOMFORT
DESCRIPTORS: ACHING;DISCOMFORT

## 2020-10-31 ASSESSMENT — PAIN DESCRIPTION - ORIENTATION
ORIENTATION: LEFT
ORIENTATION: LEFT

## 2020-10-31 ASSESSMENT — PAIN - FUNCTIONAL ASSESSMENT
PAIN_FUNCTIONAL_ASSESSMENT: ACTIVITIES ARE NOT PREVENTED
PAIN_FUNCTIONAL_ASSESSMENT: ACTIVITIES ARE NOT PREVENTED

## 2020-10-31 ASSESSMENT — PAIN DESCRIPTION - LOCATION
LOCATION: HIP;BUTTOCKS
LOCATION: HIP;BUTTOCKS

## 2020-10-31 ASSESSMENT — PAIN DESCRIPTION - PROGRESSION
CLINICAL_PROGRESSION: GRADUALLY WORSENING
CLINICAL_PROGRESSION: GRADUALLY IMPROVING

## 2020-10-31 ASSESSMENT — PAIN DESCRIPTION - FREQUENCY
FREQUENCY: CONTINUOUS
FREQUENCY: CONTINUOUS

## 2020-10-31 ASSESSMENT — PAIN DESCRIPTION - PAIN TYPE
TYPE: ACUTE PAIN
TYPE: ACUTE PAIN

## 2020-10-31 NOTE — PROGRESS NOTES
Hospitalist Progress Note      PCP: Kenny Wilder MD    Date of Admission: 10/30/2020    Chief Complaint on Admission: fall    Pt Seen/Examined and Chart Reviewed. Admitting dx fall, buttock hematoma    SUBJECTIVE/OBJECTIVE:   Patient with h/o PFO, CVA, DM, admitted with fall, found to have left gluteal hematoma, was unable to ambulate safely in ER. H/o ataxia with falls. Overnight patient was afebrile, on room air, he was having trouble getting in and out of MRI, still having pain in left buttock but tramadol helps. Allergies  Cat hair extract and Tetracyclines & related    Medications      Scheduled Meds:   sodium chloride flush  10 mL Intravenous 2 times per day    atorvastatin  40 mg Oral Nightly    insulin lispro  0-12 Units Subcutaneous TID WC    insulin lispro  0-6 Units Subcutaneous Nightly    aspirin  81 mg Oral Daily    gabapentin  600 mg Oral Nightly    gabapentin  300 mg Oral Daily    vitamin C  1,000 mg Oral Daily    gabapentin  300 mg Oral Lunch    insulin glargine  25 Units Subcutaneous Nightly    lidocaine  1 patch Transdermal Daily       Infusions:   dextrose         PRN Meds:  sodium chloride flush, acetaminophen **OR** acetaminophen, polyethylene glycol, promethazine **OR** ondansetron, traMADol, glucose, dextrose, glucagon (rDNA), dextrose    Vitals    TEMPERATURE:  Current - Temp: 97.2 °F (36.2 °C);  Max - Temp  Av.5 °F (36.4 °C)  Min: 97.2 °F (36.2 °C)  Max: 97.8 °F (36.6 °C)  RESPIRATIONS RANGE: Resp  Avg: 15.4  Min: 12  Max: 18  PULSE RANGE: Pulse  Av.7  Min: 85  Max: 107  BLOOD PRESSURE RANGE:  Systolic (05MJW), EGX:274 , Min:102 , XWO:103   ; Diastolic (58UXS), ECR:54, Min:59, Max:90    PULSE OXIMETRY RANGE: SpO2  Av.8 %  Min: 93 %  Max: 97 %  24HR INTAKE/OUTPUT:      Intake/Output Summary (Last 24 hours) at 10/31/2020 0926  Last data filed at 10/31/2020 0538  Gross per 24 hour   Intake 360 ml   Output 350 ml   Net 10 ml       Exam:      General appearance: No apparent distress, appears stated age and cooperative. Lungs: Clear to ascultation, bilaterally without Rales/Wheezes/Rhonchi with good respiratory effort. Heart: Regular rate and rhythm with Normal S1/S2 without  murmurs, rubs or gallops, point of maximum impulse non-displaced  Abdomen: Soft, non-tender or non-distended without rigidity or guarding and positive bowel sounds all four quadrants. Extremities: No clubbing, cyanosis, or edema bilaterally. Left buttock induration with warmth and bruising  Skin: Skin color, texture, turgor normal.   Neurologic: Alert and oriented X 3,   grossly non-focal.  Mental status: Alert, oriented, thought content appropriate. Data    Recent Labs     10/30/20  1012 10/31/20  0633   WBC 11.6* 15.4*   HGB 15.1 13.2*   HCT 45.9 39.0*    260      Recent Labs     10/30/20  1012 10/31/20  0633    137   K 4.4 4.0    102   CO2 23 23   BUN 10 22*   CREATININE 0.6* 0.9     No results for input(s): AST, ALT, ALB, BILIDIR, BILITOT, ALKPHOS in the last 72 hours. No results for input(s): INR in the last 72 hours. Recent Labs     10/30/20  1012   TROPONINI <0.01       Consults:     IP CONSULT TO HOSPITALIST  IP CONSULT TO NEUROLOGY  IP CONSULT TO SOCIAL WORK  IP CONSULT TO [address] [address] Problems    Diagnosis Date Noted    Falls frequently [R29.6] 10/30/2020         ASSESSMENT AND PLAN      Fall, unsteady gait: With previous h/o cereberral CVAs  Cont with tele, Neurochecks, PT/OT, appreciate neurology assistance  Unclear of primidone caused ataxia- will get neurology opinion if safe to continue  brain MRI non acute  Keep on tele  Has known PFO  Cont with asa, statin     Left gluteal hematoma:  Tramadol as needed, will hold DVT prophylaxis for now, SCD     DM type 2:  Cont with lantus and gabapentin for diabetic neuropathy     Leukocytosis:  Afebrile. Possibly reactive from hematoma. Observe off antibiotics.        DVT Prophylaxis: SCD  Diet: DIET CARDIAC;  Code Status: Full Code    PT/OT Derk Leyden, MD

## 2020-10-31 NOTE — PLAN OF CARE
Problem: Falls - Risk of:  Goal: Will remain free from falls  Description: Will remain free from falls  Outcome: Ongoing  Note: Patient at risk for falls. Patient resting quietly in bed. Side rails up x 2. Non-skid socks on. Bed locked in lowest position. Bed alarm on. Bedside table /call light and personal belongings within reach. Patient instructed to call for assistance. Patient verbalized understanding. Will continue to monitor. Problem: Skin Integrity:  Goal: Absence of new skin breakdown  Description: Absence of new skin breakdown  Outcome: Ongoing  Note: Pt at risk for skin breakdown. Skin assessment complete. No signs of skin breakdown. Pt has hematoma on right hip/buttock from earlier fall. Wound is open to air. Pt repositioned in bed with pillow support. Problem: Pain:  Goal: Pain level will decrease  Description: Pain level will decrease  Outcome: Ongoing  Note: Patient complains of pain at level 5/10 from fall on 10/30/220. Patient describes pain as aching discomfort. Pt has hematoma on right hip/buttock. Patient requests pain medication. Patient medicated with tramadol and states that pain is getting better.      Problem: Pain:  Goal: Control of acute pain  Description: Control of acute pain  Outcome: Ongoing     Problem: Pain:  Goal: Control of chronic pain  Description: Control of chronic pain  Outcome: Ongoing

## 2020-10-31 NOTE — PROGRESS NOTES
Pt concerned that he takes bydureon every week on Saturday. Messaged on call hospitalist for order. Rx state that they do not stock this drug but the patient can have it brought in to the hospital. Pt updated and contacting family member.

## 2020-10-31 NOTE — PROGRESS NOTES
Pt is a/o x 4. VS stable at this time. Pt fell on 10/30/20 and has a hematoma on the right hip/buttock that is open to air. Bed is in the lowest/locked position with bed alarm on. Pt is wearing non-skid socks. Call light/bedside table and personal belongings with in reach. Pt advised to call out with any needs. Pt verbalized understanding.

## 2020-10-31 NOTE — H&P
Hospital Medicine History & Physical      PCP: Sheri Kaiser MD    Date of Admission: 10/30/2020    Date of Service: Pt seen/examined on 10/30/2020 and Admitted to Inpatient with expected LOS greater than two midnights due to medical therapy. Chief Complaint:  Fall, left buttock pain      History Of Present Illness: The patient is a 80 y.o. 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 Patient is a retired pediatrician. He states he does not fall often, once every few months. Today he fell without LOC. He is not sure why it happened. Just lost balance. Denies any chest pain, palpitations, dizziness prior to the fall. He had significant pain in left buttock and underwent xrays and Ct in ER- showed hematoma without fracture or active extravasation. He had admission to [facility] in 2019 with falls and ataxia and was found to have embolic CVAs. Past Medical History:        Diagnosis Date    Diabetes mellitus ([facility] [address]     Hyperlipidemia     Tremor        Past Surgical History:    History reviewed. No pertinent surgical history. Medications Prior to Admission:    Prior to Admission medications    Medication Sig Start Date End Date Taking? Authorizing Provider   vitamin C (ASCORBIC ACID) 500 MG tablet Take 1,000 mg by mouth daily   Yes Historical Provider, MD   Cholecalciferol (VITAMIN D) 50 MCG (2000 UT) CAPS capsule Take 1 capsule by mouth   Yes Historical Provider, MD   metFORMIN (GLUCOPHAGE-XR) 500 MG extended release tablet Take 2,000 mg by mouth nightly   Yes Historical Provider, MD   Glucosamine-Chondroitin (GLUCOSAMINE CHONDR COMPLEX PO) Take 1 tablet by mouth 2 times daily   Yes Historical Provider, MD   aspirin 325 MG EC tablet Take 1 tablet by mouth daily 6/1/19  Yes Adriel Dupree MD   gabapentin (NEURONTIN) 300 MG capsule Take 300 mg by mouth 2 times daily. Indications: breakfast and lunch   Yes Historical Provider, MD   gabapentin (NEURONTIN) 600 MG tablet Take 600 mg by mouth nightly. Yes Historical Provider, MD   Exenatide (BYDUREON) 2 MG PEN Inject 2 mg into the skin every 7 days On Saturdays   Yes Historical Provider, MD   Insulin Detemir (LEVEMIR FLEXPEN SC) Inject 30 Units into the skin nightly   Yes Historical Provider, MD   atorvastatin (LIPITOR) 40 MG tablet Take 40 mg by mouth every other day   Yes Historical Provider, MD   Misc. Devices (ROLLER [place]) MISC 1 each by Does not apply route daily 12/24/17   Anna Marino MD       Allergies:  Cat hair extract and Tetracyclines & related    Social History:  The patient currently lives at home. TOBACCO:   reports that he has never smoked. He has never used smokeless tobacco.  ETOH:   reports no history of alcohol use. Family History:  Reviewed in detail and Positive as follows:        Problem Relation Age of Onset    Diabetes Mother        REVIEW OF SYSTEMS:   Positive and negative as noted in the HPI. All other systems reviewed and negative. PHYSICAL EXAM:    BP (!) 143/81   Pulse 101   Temp 97.5 °F (36.4 °C) (Oral)   Resp 18   Ht 6' (1.829 m)   Wt 176 lb (79.8 kg)   SpO2 97%   BMI 23.87 kg/m²     General appearance: mild distress appears stated age and cooperative. HEENT Normal cephalic, atraumatic without obvious deformity. Conjunctivae/corneas clear. Neck: Supple, No jugular venous distention/bruits. Trachea midline without thyromegaly or adenopathy with full range of motion. Lungs: Clear to auscultation, bilaterally without Rales/Wheezes/Rhonchi with good respiratory effort. Heart: Regular rate and rhythm with Normal S1/S2 without murmurs, rubs or gallops, point of maximum impulse non-displaced  Abdomen: Soft, non-tender or non-distended without rigidity or guarding and positive bowel sounds all four quadrants. Extremities: No clubbing, cyanosis, or edema bilaterally.   There questions or concerns please feel free to contact me at [phone].

## 2020-10-31 NOTE — CONSULTS
Neurology Consult Note  Reason for Consult: falls, ataxia    Chief complaint: \"I had a fall\"    Dr Olga Tyler MD asked me to see Ernestine Parks in consultation for evaluation of gait imbalance and recent fall. History of Present Illness:  Ernestine Parks is a 80 y.o. male w/ h/o PFO, CVA, DM II, diabetic neuropathy, essential tremor in the bilateral hands, and chronic low back pain presents with recent fall 2 days ago resulting in left gluteal hematoma. Pt reports he was getting dressed in his home when the fall occurred. He reports losing his balance at this time and falling backwards landing on his buttock. Pt denies any lightheadedness, vertigo, weakness, stiffness, slowness, etc.  Prior to this fall, pt reports having intermittent imbalance which he mainly noted with inititially getting up in the morning. He has a long standing history of diabetes and reports developing peripheral neuropathy in the distal LEs a little over a year ago that involved aching sensation. He was started on Gabapentin about a year ago and has been taking 300 mg QAM, 300 mg Qnoon, and 600 mg QHS since then. He reports 3 weeks ago noting increasing tremor in the hands. Although he denies worsening imbalance during this time, he reported beginning to use a cane when leaving the house \"just in case. \"  2 weeks ago, pt was started on primidone by his PCP to target tremor in the hands. He started at 12.5 mg QHS and increased to 25 mg QHS about a week ago. He denies any side effects with the medication. He typically walks for exercise, but has done less over the past 9 months due to the pandemic limiting his ability to leave his apartment complex. He reports his HbA1c% was checked within the past 6 months by his PCP and was reportedly optimized with medication. Medical History:  Past Medical History:   Diagnosis Date    Diabetes mellitus ([facility] [address]     Hyperlipidemia     Tremor      History reviewed.  No pertinent surgical history. Scheduled Meds:   sodium chloride flush  10 mL Intravenous 2 times per day    atorvastatin  40 mg Oral Nightly    insulin lispro  0-12 Units Subcutaneous TID WC    insulin lispro  0-6 Units Subcutaneous Nightly    aspirin  81 mg Oral Daily    gabapentin  600 mg Oral Nightly    gabapentin  300 mg Oral Daily    vitamin C  1,000 mg Oral Daily    gabapentin  300 mg Oral Lunch    insulin glargine  25 Units Subcutaneous Nightly    lidocaine  1 patch Transdermal Daily     Continuous Infusions:   dextrose       PRN Meds:.sodium chloride flush, acetaminophen **OR** acetaminophen, polyethylene glycol, promethazine **OR** ondansetron, traMADol, glucose, dextrose, glucagon (rDNA), dextrose    Medications Prior to Admission: vitamin C (ASCORBIC ACID) 500 MG tablet, Take 1,000 mg by mouth daily  Cholecalciferol (VITAMIN D) 50 MCG (2000 UT) CAPS capsule, Take 1 capsule by mouth  metFORMIN (GLUCOPHAGE-XR) 500 MG extended release tablet, Take 2,000 mg by mouth nightly  Glucosamine-Chondroitin (GLUCOSAMINE CHONDR COMPLEX PO), Take 1 tablet by mouth 2 times daily  aspirin 325 MG EC tablet, Take 1 tablet by mouth daily  gabapentin (NEURONTIN) 300 MG capsule, Take 300 mg by mouth 2 times daily. Indications: breakfast and lunch  gabapentin (NEURONTIN) 600 MG tablet, Take 600 mg by mouth nightly. Exenatide (BYDUREON) 2 MG PEN, Inject 2 mg into the skin every 7 days On Saturdays  Insulin Detemir (LEVEMIR FLEXPEN SC), Inject 30 Units into the skin nightly  atorvastatin (LIPITOR) 40 MG tablet, Take 40 mg by mouth every other day  Misc.  Devices (ROLLER [place]) MISC, 1 each by Does not apply route daily    Allergies   Allergen Reactions    Cat Hair Extract      Nasal congestion    Tetracyclines & Related Rash       Family History   Problem Relation Age of Onset    Diabetes Mother        Social History     Tobacco Use   Smoking Status Never Smoker   Smokeless Tobacco Never Used     Social History     Substance and Sexual Activity   Drug Use No     Social History     Substance and Sexual Activity   Alcohol Use No     ROS:  Constitutional- No weight loss or fevers  Eyes- No diplopia. No photophobia. Ears/nose/throat- No dysphagia. No Dysarthria  Cardiovascular- No palpitations. No chest pain  Respiratory- No dyspnea. No Cough  Gastrointestinal- No Abdominal pain. No Vomiting. Genitourinary- No incontinence. No urinary retention  Musculoskeletal- No myalgia. No arthralgia  Skin- No rash. No easy bruising. Psychiatric- No depression. No anxiety  Endocrine- No diabetes. No thyroid issues. Hematologic- No bleeding difficulty. No fatigue  Neurologic- No weakness. No Headache. Exam:  Blood pressure (!) 152/69, pulse 85, temperature 97.2 °F (36.2 °C), temperature source Oral, resp. rate 17, height 6' (1.829 m), weight 176 lb (79.8 kg), SpO2 95 %. Constitutional    Vital signs: BP, HR, and RR reviewed   General Alert, no distress, well-nourished  Eyes: unable to visualize the fundi  Cardiovascular: pulses symmetric in all 4 extremities. No peripheral edema. Psychiatric: cooperative with examination, no  psychotic behavior noted. Neurologic  Mental status:   orientation to person and place   General fund of knowledge grossly intact   Memory grossly intact   Attention intact as able to attend well to the exam     Language fluent in conversation   Comprehension intact; follows simple commands  Cranial nerves:   CN2: Visual Fields full w/o extinction on confrontational testing,   CN 3,4,6: extraocular muscles intact,  CN5: facial sensation symmetric   CN7:face symmetric without dysarthria,   CN8: hearing grossly intact  CN9: palate elevated symmetrically  CN11: trap full strength on shoulder shrug  CN12: tongue midline with protrusion  Strength: No pronator drift.   Good strength in all 4 extremities   Deep tendon reflexes: reduced throughout all 4 extremities  Sensory: light touch intact in the BUEs, reduced to the toes bilaterally. No sensory extinction on double simultaneous stimulation  Cerebellar/coordination: finger nose finger normal without ataxia, evaluation limited by tremoring (L>R)  Tone: Increased tone in the BUEs (L>R); normal in the BLEs  Gait: deferred at this time for safety    Labs  WBC 15.4  RBC 4.20  Hb 13.2  Hct 39.0  Platelets 765    Na 529  K 4.0  Cl 102  CO2 23  AG 12  Glucose 126  BUN 22  Cr 0.9  GFR >60    Studies  MRI brain 10/31/20  Impression    1. Brain atrophy    2. Periventricular microangiopathic ischemic small vessel disease and asymmetric subcortical microangiopathic changes, left greater than right    3. No evidence of acute intracranial hemorrhage      CT head 10/30/20  Final Result       1. No findings for acute intracranial abnormality.       2.  Age-related atrophy with patchy periventricular white matter changes bilaterally consistent with chronic small vessel ischemia. MRI brain 5/28/2019  Impression         1. Small acute ischemic insults in the right cerebellar hemisphere. The ischemic insult in the superior medial right cerebellum demonstrates associated petechial hemorrhagic change. 2. 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 Impression  1. Fall  2. Ataxia  3. History of PFO  4. History of cereberal CVAs  5. History of diabetic neuropathy    Recommendations  1. Continue neurochecks Q4H  2. Proceed with rehabilitation efforts (PT/OT)  3. Telemetry  4. Continue vascular prophylaxis with ASA and statin  5. Encouraged using assistance device with all further ambulation/transferring. Discussed using handle bars and chairs with bathing and toileting.     6. Will discuss further with attending neurologist.    Electronically signed by Sara Ortiz PA-C on 10/31/2020 at 8:24 AM    [facility] Neurology    A copy of this note was provided for Dr Asim Dwyer MD

## 2020-10-31 NOTE — CONSULTS
Clinical Pharmacy Progress Note  Medication History     Admit Date: 10/30/2020    Asked by Dr. Victor Manuel Vaughan to clarify home medications. List of current medications patient is taking is complete. Added primidone 25mg to medication list. Reviewed meds with patient yesterday and he provided pharmacist a list of his medications. Primidone was not on this list as it was just initiated 2 weeks ago. RN confirmed with patient that primidone is the only medication missing from his med list.       Please call with questions.   Gila Fields, PharmD, [address] [place] [address] Pharmacy: [phone]  [address] [place] [address]: [phone]  10/31/2020 2:53 PM

## 2020-11-01 LAB
ALBUMIN SERPL-MCNC: 3.7 G/DL (ref 3.4–5)
ANION GAP SERPL CALCULATED.3IONS-SCNC: 11 MMOL/L (ref 3–16)
BASOPHILS ABSOLUTE: 0.1 K/UL (ref 0–0.2)
BASOPHILS RELATIVE PERCENT: 0.5 %
BUN BLDV-MCNC: 28 MG/DL (ref 7–20)
CALCIUM SERPL-MCNC: 8.6 MG/DL (ref 8.3–10.6)
CHLORIDE BLD-SCNC: 104 MMOL/L (ref 99–110)
CO2: 25 MMOL/L (ref 21–32)
CREAT SERPL-MCNC: 0.9 MG/DL (ref 0.8–1.3)
EOSINOPHILS ABSOLUTE: 0.2 K/UL (ref 0–0.6)
EOSINOPHILS RELATIVE PERCENT: 1.7 %
GFR AFRICAN AMERICAN: >60
GFR NON-AFRICAN AMERICAN: >60
GLUCOSE BLD-MCNC: 103 MG/DL (ref 70–99)
GLUCOSE BLD-MCNC: 225 MG/DL (ref 70–99)
GLUCOSE BLD-MCNC: 87 MG/DL (ref 70–99)
GLUCOSE BLD-MCNC: 88 MG/DL (ref 70–99)
GLUCOSE BLD-MCNC: 95 MG/DL (ref 70–99)
HCT VFR BLD CALC: 35.8 % (ref 40.5–52.5)
HEMOGLOBIN: 12 G/DL (ref 13.5–17.5)
LYMPHOCYTES ABSOLUTE: 1.2 K/UL (ref 1–5.1)
LYMPHOCYTES RELATIVE PERCENT: 9.7 %
MCH RBC QN AUTO: 31.5 PG (ref 26–34)
MCHC RBC AUTO-ENTMCNC: 33.6 G/DL (ref 31–36)
MCV RBC AUTO: 93.8 FL (ref 80–100)
MONOCYTES ABSOLUTE: 1.5 K/UL (ref 0–1.3)
MONOCYTES RELATIVE PERCENT: 12.1 %
NEUTROPHILS ABSOLUTE: 9.7 K/UL (ref 1.7–7.7)
NEUTROPHILS RELATIVE PERCENT: 76 %
PDW BLD-RTO: 13.3 % (ref 12.4–15.4)
PERFORMED ON: ABNORMAL
PERFORMED ON: ABNORMAL
PERFORMED ON: NORMAL
PERFORMED ON: NORMAL
PHOSPHORUS: 3.5 MG/DL (ref 2.5–4.9)
PLATELET # BLD: 229 K/UL (ref 135–450)
PMV BLD AUTO: 9 FL (ref 5–10.5)
POTASSIUM SERPL-SCNC: 3.7 MMOL/L (ref 3.5–5.1)
RBC # BLD: 3.82 M/UL (ref 4.2–5.9)
SODIUM BLD-SCNC: 140 MMOL/L (ref 136–145)
WBC # BLD: 12.7 K/UL (ref 4–11)

## 2020-11-01 PROCEDURE — 97166 OT EVAL MOD COMPLEX 45 MIN: CPT

## 2020-11-01 PROCEDURE — 6370000000 HC RX 637 (ALT 250 FOR IP): Performed by: INTERNAL MEDICINE

## 2020-11-01 PROCEDURE — 97535 SELF CARE MNGMENT TRAINING: CPT

## 2020-11-01 PROCEDURE — 85025 COMPLETE CBC W/AUTO DIFF WBC: CPT

## 2020-11-01 PROCEDURE — 1200000000 HC SEMI PRIVATE

## 2020-11-01 PROCEDURE — 97116 GAIT TRAINING THERAPY: CPT

## 2020-11-01 PROCEDURE — 97161 PT EVAL LOW COMPLEX 20 MIN: CPT

## 2020-11-01 PROCEDURE — 2580000003 HC RX 258: Performed by: INTERNAL MEDICINE

## 2020-11-01 PROCEDURE — 80069 RENAL FUNCTION PANEL: CPT

## 2020-11-01 PROCEDURE — 36415 COLL VENOUS BLD VENIPUNCTURE: CPT

## 2020-11-01 RX ORDER — SODIUM CHLORIDE 9 MG/ML
INJECTION, SOLUTION INTRAVENOUS CONTINUOUS
Status: ACTIVE | OUTPATIENT
Start: 2020-11-01 | End: 2020-11-01

## 2020-11-01 RX ORDER — DOCUSATE SODIUM 100 MG/1
100 CAPSULE, LIQUID FILLED ORAL 2 TIMES DAILY
Status: DISCONTINUED | OUTPATIENT
Start: 2020-11-01 | End: 2020-11-04 | Stop reason: HOSPADM

## 2020-11-01 RX ADMIN — GABAPENTIN 600 MG: 600 TABLET, FILM COATED ORAL at 22:36

## 2020-11-01 RX ADMIN — ACETAMINOPHEN 1000 MG: 500 TABLET ORAL at 13:11

## 2020-11-01 RX ADMIN — DOCUSATE SODIUM 100 MG: 100 CAPSULE, LIQUID FILLED ORAL at 22:35

## 2020-11-01 RX ADMIN — GABAPENTIN 300 MG: 300 CAPSULE ORAL at 12:26

## 2020-11-01 RX ADMIN — Medication 10 ML: at 08:22

## 2020-11-01 RX ADMIN — INSULIN LISPRO 4 UNITS: 100 INJECTION, SOLUTION INTRAVENOUS; SUBCUTANEOUS at 12:54

## 2020-11-01 RX ADMIN — OXYCODONE HYDROCHLORIDE AND ACETAMINOPHEN 1000 MG: 500 TABLET ORAL at 08:22

## 2020-11-01 RX ADMIN — ACETAMINOPHEN 1000 MG: 500 TABLET ORAL at 22:36

## 2020-11-01 RX ADMIN — SODIUM CHLORIDE: 9 INJECTION, SOLUTION INTRAVENOUS at 13:12

## 2020-11-01 RX ADMIN — INSULIN GLARGINE 25 UNITS: 100 INJECTION, SOLUTION SUBCUTANEOUS at 22:40

## 2020-11-01 RX ADMIN — ASPIRIN 81 MG: 81 TABLET, COATED ORAL at 08:22

## 2020-11-01 RX ADMIN — PRIMIDONE 25 MG: 50 TABLET ORAL at 22:44

## 2020-11-01 RX ADMIN — DOCUSATE SODIUM 100 MG: 100 CAPSULE, LIQUID FILLED ORAL at 12:26

## 2020-11-01 RX ADMIN — GABAPENTIN 300 MG: 300 CAPSULE ORAL at 08:22

## 2020-11-01 RX ADMIN — ACETAMINOPHEN 1000 MG: 500 TABLET ORAL at 06:53

## 2020-11-01 RX ADMIN — ATORVASTATIN CALCIUM 40 MG: 40 TABLET, FILM COATED ORAL at 22:36

## 2020-11-01 ASSESSMENT — PAIN DESCRIPTION - PAIN TYPE
TYPE: ACUTE PAIN
TYPE: ACUTE PAIN

## 2020-11-01 ASSESSMENT — PAIN DESCRIPTION - PROGRESSION
CLINICAL_PROGRESSION: NOT CHANGED
CLINICAL_PROGRESSION: NOT CHANGED

## 2020-11-01 ASSESSMENT — PAIN SCALES - GENERAL
PAINLEVEL_OUTOF10: 0
PAINLEVEL_OUTOF10: 3
PAINLEVEL_OUTOF10: 3
PAINLEVEL_OUTOF10: 0
PAINLEVEL_OUTOF10: 3
PAINLEVEL_OUTOF10: 0
PAINLEVEL_OUTOF10: 0
PAINLEVEL_OUTOF10: 3

## 2020-11-01 ASSESSMENT — PAIN DESCRIPTION - FREQUENCY
FREQUENCY: INTERMITTENT
FREQUENCY: CONTINUOUS

## 2020-11-01 ASSESSMENT — PAIN DESCRIPTION - DESCRIPTORS
DESCRIPTORS: SORE
DESCRIPTORS: SORE

## 2020-11-01 ASSESSMENT — PAIN DESCRIPTION - ONSET
ONSET: ON-GOING
ONSET: ON-GOING

## 2020-11-01 ASSESSMENT — PAIN DESCRIPTION - LOCATION
LOCATION: HIP
LOCATION: JAW

## 2020-11-01 ASSESSMENT — PAIN DESCRIPTION - ORIENTATION
ORIENTATION: RIGHT
ORIENTATION: LEFT

## 2020-11-01 NOTE — PROGRESS NOTES
Physical Therapy    Facility/Department: [address]  Initial Assessment and treatment    NAME: Anival Parekh  : 10/27/1928  MRN: [id number]    Date of Service: 2020    Discharge Recommendations:Andrea Diaz scored a 19/24 on the AM-PAC short mobility form. Current research shows that an AM-PAC score of 17 or less is typically not associated with a discharge to the patient's home setting. Based on the patient's AM-PAC score and their current functional mobility deficits, it is recommended that the patient have 5-7 sessions per week of Physical Therapy at d/c to increase the patient's independence. At this time, this patient demonstrates the endurance, and/or tolerance for 3 hours of therapy each day, with a treatment frequency of 5-7x/wk. Please see assessment section for further patient specific details. If patient discharges prior to next session this note will serve as a discharge summary. Please see below for the latest assessment towards goals. PT Equipment Recommendations  Equipment Needed: (has walker and cane so no needs if he returns home)    Assessment   Body structures, Functions, Activity limitations: Decreased functional mobility   Assessment: Pt is a 79 y/o male who presents s/p fall and hematoma on R buttock. He demonstrates impaired balance with ambulation, impaired gait mechanics, decreased strength, decreased endurance with mobility. He is typically mod I at baseline without device in the home and with use of cane outside. At this time he is requiring use of walker with mobility and requiring physical assist with ambulation and transfers. He is motivated to get better and would be able to tolerate 3 hours of therapy a day. He would benefit from skilled PT in the acute setting and in an inpatient facility at discharge to improve his impairments and functional limitations so that he is able to return to his PLOF.   Treatment Diagnosis: impaired mobility secondary to fall and hematoma  Prognosis: Good  Decision Making: Low Complexity  PT Education: Goals;PT Role;Plan of Care;Transfer Training;Gait Training;General Safety; Functional Mobility Training  Patient Education: pt verbalized understanding  REQUIRES PT FOLLOW UP: Yes  Activity Tolerance  Activity Tolerance: Patient limited by fatigue;Patient limited by pain; Patient limited by endurance  Activity Tolerance: Endurance is his primarly limiting factor; noting he was fatigued after ambulation and stairs       Patient Diagnosis(es): The primary encounter diagnosis was Fall, initial encounter. A diagnosis of Hematoma was also pertinent to this visit. has a past medical history of Diabetes mellitus ([facility] [address] Hyperlipidemia, and Tremor. has no past surgical history on file. Restrictions  Position Activity Restriction  Other position/activity restrictions: Up with assist, up as tolerated  Vision/Hearing  Vision: Impaired  Vision Exceptions: Wears glasses at all times  Hearing: Within functional limits     Subjective  General  Chart Reviewed: Yes  Patient assessed for rehabilitation services?: Yes  Additional Pertinent Hx: Patient is a 81 y/o male with h/o PFO, CVA, DM, admitted with fall, found to have left gluteal hematoma. Chart states pt has had multiple falls but pt states he has only had one in the last 6 months. Family / Caregiver Present: No  Referring Practitioner: Alfredo Rodrigez MD  Diagnosis: falls frequently  Follows Commands: Within Functional Limits  Subjective  Subjective: \"I am just having pain on my L but otherwise doing well. \" Pt presents supine in bed and agreeable to PT.   Pain Screening  Patient Currently in Pain: Yes(L buttock though not specified)  Vital Signs  Patient Currently in Pain: Yes(L buttock though not specified)       Orientation  Orientation  Overall Orientation Status: Within Functional Limits  Social/Functional History  Social/Functional History  Lives With: Alone  Type of Home: Apartment(independent living)  Home Layout: One level  Home Access: Stairs to enter with rails(does have an elevator but he states he uses stairs-does not technically need to do any)  Entrance Stairs - Number of Steps: 22  Entrance Stairs - Rails: Both  Bathroom Shower/Tub: Walk-in shower  Bathroom Toilet: Handicap height  Bathroom Equipment: Grab bars in shower, Built-in shower seat, Hand-held shower  Home Equipment: [employer], 4 wheeled walker, Alert Button  ADL Assistance: Independent  Homemaking Assistance: Independent(light meals only- gets a full dinner provided daily during the week)  Homemaking Responsibilities: Yes  Ambulation Assistance: Independent(pt uses cane outside and no AD in the building. Does not use walker)  Transfer Assistance: Independent  Active : Yes  Occupation: Retired  Type of occupation: pediatrician  Additional Comments: 1 fall over the past 6 months, R handed  Cognition        Objective             Strength RLE  Comment: 4+/5 with MMT  Strength LLE  Strength LLE: Exception  Comment: 4/5 grossly; weaker than R side        Bed mobility  Supine to Sit: Stand by assistance(HOB elevated)  Sit to Supine: (pt left up in chair)  Scooting: Stand by assistance(to EOB)  Transfers  Sit to Stand: Minimal Assistance(from bed, from toilet)  Stand to sit: Contact guard assistance  Comment: with cueing needed for hand placement with transfers. Used RW  Ambulation  Ambulation?: Yes  Ambulation 1  Surface: level tile  Device: Rolling Walker  Assistance: Minimal assistance;Contact guard assistance  Quality of Gait: unsteady; decreased step height and length, almost shuffling, narrow [place], slow noble  Distance: 120'  Comments: Min A progressing to CGA with ambulation; unsteady especially with backing up but also initially R sided lean with ambulation requiring min A. Pt fatigued after ambulation and taking 4 stairs.   Stairs/Curb  Stairs?: Yes  Stairs  # Steps : 4  Stairs Height: 6\"  Rails: Bilateral  Device: No Device  Assistance: Contact guard assistance  Comment: knees do buckle twice with stairs due to weakness; pt with non reciprocal pattern and therapist educating him on proper sequencing regarding his injured LE. Balance  Sitting - Static: Fair;-  Sitting - Dynamic: Poor;+  Standing - Static: Fair;-  Standing - Dynamic: Poor;+      Treatment:  Functional mobility training, gait training, and pt education    Plan   Plan  Times per week: 5-7  Times per day: Daily  Current Treatment Recommendations: Strengthening, IADL Training, Neuromuscular Re-education, Home Exercise Program, ROM, Balance Training, Endurance Training, Safety Education & Training, Functional Mobility Training, Transfer Training, Gait Training, Stair training  Safety Devices  Type of devices: Chair alarm in place, Call light within reach, Left in chair, Nurse notified    AM-PAC Score  AM-PAC Inpatient Mobility Raw Score : 19 (11/01/20 1220)  AM-PAC Inpatient T-Scale Score : 45.44 (11/01/20 1220)  Mobility Inpatient CMS 0-100% Score: 41.77 (11/01/20 1220)  Mobility Inpatient CMS G-Code Modifier : CK (11/01/20 1220)          Goals  Short term goals  Time Frame for Short term goals: By discharge  Short term goal 1: Pt will perform bed mobility with mod I from flat bed  Short term goal 2: Pt will transfer with supervision and use of walker  Short term goal 3: Pt will ambulate x 150' with RW and supervision  Patient Goals   Patient goals :  To return home       Therapy Time   Individual Concurrent Group Co-treatment   Time In 3969         Time Out 1159         Minutes 28         Timed Code Treatment Minutes: 13 Minutes    Timed Code Treatment Minutes:  13 Minutes    Total Treatment Minutes:  28 minutes    Joya Babinski, PT

## 2020-11-01 NOTE — CARE COORDINATION
CM following, received call from nurse bout pt need for PT TO at [address] [facility], as pt from [address] [facility] to return home will need to Quarantine at [facility], spoke with Rachael Hennessy [phone] she will call son Modesta Ortiz [phone] to explain pt options with quarantine to return to [facility]. Pt has medicare no precert needed. I spoke with Danilo Lovett he had questions and delma DC to [facility] will call him back.   Electronically signed by Miryam Donahue RN on 11/1/2020 at 2:51 PM  [phone]

## 2020-11-01 NOTE — PROGRESS NOTES
Occupational Therapy   Occupational Therapy Initial Assessment/Treatment  Date: 2020   Patient Name: Anraldo Hutchinson  MRN: [id number]     : 10/27/1928    Date of Service: 2020    Discharge Recommendations:    Arnaldo Hutchinson scored a 16/24 on the AM-PAC ADL Inpatient form. Current research shows that an AM-PAC score of 17 or less is typically not associated with a discharge to the patient's home setting. Based on the patient's AM-PAC score and their current ADL deficits, it is recommended that the patient have 5-7 sessions per week of Occupational Therapy at d/c to increase the patient's independence. At this time, this patient demonstrates the endurance, and/or tolerance for 3 hours of therapy each day, with a treatment frequency of 5-7x/wk. Please see assessment section for further patient specific details. If patient discharges prior to next session this note will serve as a discharge summary. Please see below for the latest assessment towards goals. OT Equipment Recommendations  Equipment Needed: No    Assessment   Performance deficits / Impairments: Decreased functional mobility ; Decreased ADL status; Decreased endurance;Decreased balance  Assessment: Pt presents with a decline in functional independence. Pt lives alone in [facility] and was indepndent. Currently, pt with decreased sitting and standing balance and is requiring assist with ADL and mobility. Safety is a concern and feel pt would benefit from further oT services,  Treatment Diagnosis: Impaired ADL and mobility  Prognosis: Good  Decision Making: Medium Complexity  OT Education: OT Role;Plan of Care;Transfer Training  Patient Education: Pt verbalized understanding  REQUIRES OT FOLLOW UP: Yes  Safety Devices  Safety Devices in place: Yes  Type of devices: Left in chair;Call light within reach; Chair alarm in place;Nurse notified         Treatment Diagnosis: Impaired ADL and mobility      Restrictions  Position Activity Restriction  Other position/activity restrictions: Up with assist, up as tolerated    Subjective   General  Patient assessed for rehabilitation services?: Yes  Additional Pertinent Hx: Pt admitted 10/30/2020 with fall and left buttock pain.    x-ray Lft hip/pelvis= No discrete findings for acute traumatic bony abnormality within the left hip, Moderate arthritic changes in the bilateral hips, Lower lumbar spondylosis. Head CT (-) acute     Brain MRI (-)       PMH includes: DM, tremor  Family / Caregiver Present: No  Referring Practitioner: Dr. Katie Marcus  Diagnosis: Fall  Subjective  Subjective: Pt in bed upon entry. Pt reports this is the only fall he's had in the last 6 months. Patient Currently in Pain: Yes(L buttock though not specified)  Vital Signs  Orthostatic B/P and Pulse?: (P) Yes  Patient Currently in Pain: Yes(L buttock though not specified)  Orthostatic B/P and Pulse?: (P) Yes  Social/Functional History  Social/Functional History  Lives With: Alone  Type of Home: Apartment(independent living)  Home Layout: One level  Home Access: Stairs to enter with rails(does have an elevator but he states he uses stairs-does not technically need to do any)  Entrance Stairs - Number of Steps: 22  Entrance Stairs - Rails: Both  Bathroom Shower/Tub: Walk-in shower  Bathroom Toilet: Handicap height  Bathroom Equipment: Grab bars in shower, Built-in shower seat, Hand-held shower  Home Equipment: Cane, 4 wheeled walker, Alert Button  ADL Assistance: Independent  Homemaking Assistance: Independent(light meals only- gets a full dinner provided daily during the week)  Homemaking Responsibilities: Yes  Ambulation Assistance: Independent(pt uses cane outside and no AD in the building.  Does not use walker)  Transfer Assistance: Independent  Active : Yes  Occupation: Retired  Type of occupation: pediatrician  Additional Comments: 1 fall over the past 6 months, R handed       Objective   Vision: Impaired  Vision Exceptions: Wears glasses at all times  Hearing: Within functional limits    Orientation  Overall Orientation Status: Within Normal Limits     Balance  Sitting Balance: Minimal assistance  Standing Balance: Contact guard assistance  Standing Balance  Time: ~4 min  Activity: bathroom mobility/activity, walk in logan  Functional Mobility  Functional - Mobility Device: Rolling Walker  Activity: To/from bathroom; Other  Assist Level: Minimal assistance(to CG)  Toilet Transfers  Toilet - Technique: Ambulating  Equipment Used: Standard bedside commode(over toilet)  Toilet Transfer: Minimal assistance  ADL  Grooming: Contact guard assistance(to SBA washing hands standing at sink)  LE Dressing: Maximum assistance  Toileting: (denied need)  Tone RUE  RUE Tone: Normotonic  Tone LUE  LUE Tone: Normotonic  Coordination  Movements Are Fluid And Coordinated: Yes     Bed mobility  Supine to Sit: Stand by assistance(HOB elevated)  Sit to Supine: (pt left up in chair)  Scooting: Stand by assistance(to EOB)  Transfers  Stand Step Transfers: Minimal assistance  Sit to stand: Minimal assistance  Stand to sit: Minimal assistance     Cognition  Overall Cognitive Status: WNL                 LUE AROM (degrees)  LUE AROM : WFL  RUE AROM (degrees)  RUE AROM : WFL        Hand Dominance  Hand Dominance: Right     Treatment included ADL and transfer training.         Plan   Plan  Times per week: 2-5  Current Treatment Recommendations: Strengthening, Balance Training, Functional Mobility Training, Endurance Training, Self-Care / ADL             AM-PAC Score        AM-[facility] Inpatient Daily Activity Raw Score: 16 (11/01/20 1215)  AM-PAC Inpatient ADL T-Scale Score : 35.96 (11/01/20 1215)  ADL Inpatient CMS 0-100% Score: 53.32 (11/01/20 1215)  ADL Inpatient CMS G-Code Modifier : CK (11/01/20 1215)    Goals                         No goals met  Short term goals  Time Frame for Short term goals: Discharge  Short term goal 1: Transfer to/from toilet with SBA  Short term goal 2: Stance with SBA x6 min while engaging in ADL/functional mobility  Short term goal 3: Lower body dressing with Min assist and assistive devices as needed  Patient Goals   Patient goals : Go home       Therapy Time   Individual Concurrent Group Co-treatment   Time In 3040         Time Out 3133         Minutes 28         Timed Code Treatment Minutes: 25 Minutes    Total Treatment [address], OTR/L [id number]

## 2020-11-01 NOTE — PROGRESS NOTES
Hospitalist Progress Note      PCP: Radha Elizabeth MD    Date of Admission: 10/30/2020    Chief Complaint on Admission: fall    Pt Seen/Examined and Chart Reviewed. Admitting dx fall, buttock hematoma    SUBJECTIVE/OBJECTIVE:   Patient with h/o PFO, CVA, DM, admitted with fall, found to have left gluteal hematoma, was unable to ambulate safely in ER. H/o ataxia with falls. No acute events. Awaiting to be seen by PT/OT. Pain in left buttock improving but still having difficulties ambulating. No BM since admission. Agreed to take colace. Allergies  Cat hair extract and Tetracyclines & related    Medications      Scheduled Meds:   acetaminophen  1,000 mg Oral 3 times per day    primidone  25 mg Oral Nightly    sodium chloride flush  10 mL Intravenous 2 times per day    atorvastatin  40 mg Oral Nightly    insulin lispro  0-12 Units Subcutaneous TID WC    insulin lispro  0-6 Units Subcutaneous Nightly    aspirin  81 mg Oral Daily    gabapentin  600 mg Oral Nightly    gabapentin  300 mg Oral Daily    vitamin C  1,000 mg Oral Daily    gabapentin  300 mg Oral Lunch    insulin glargine  25 Units Subcutaneous Nightly    lidocaine  1 patch Transdermal Daily       Infusions:   dextrose         PRN Meds:  sodium chloride flush, polyethylene glycol, promethazine **OR** ondansetron, traMADol, glucose, dextrose, glucagon (rDNA), dextrose    Vitals    TEMPERATURE:  Current - Temp: 97.3 °F (36.3 °C);  Max - Temp  Av.7 °F (36.5 °C)  Min: 97.2 °F (36.2 °C)  Max: 98.4 °F (36.9 °C)  RESPIRATIONS RANGE: Resp  Av.2  Min: 16  Max: 17  PULSE RANGE: Pulse  Av.6  Min: 72  Max: 103  BLOOD PRESSURE RANGE:  Systolic (75HDO), HNR:362 , Min:101 , DAC:274   ; Diastolic (35CAJ), XGN:00, Min:54, Max:79    PULSE OXIMETRY RANGE: SpO2  Av.4 %  Min: 93 %  Max: 97 %  24HR INTAKE/OUTPUT:      Intake/Output Summary (Last 24 hours) at 2020 1027  Last data filed at 2020 4009  Gross per 24 hour   Intake 370 ml   Output 200 ml   Net 170 ml       Exam:      General appearance: No apparent distress, appears stated age and cooperative. Lungs: Clear to ascultation, bilaterally without Rales/Wheezes/Rhonchi with good respiratory effort. Heart: Regular rate and rhythm with Normal S1/S2 without  murmurs, rubs or gallops, point of maximum impulse non-displaced  Abdomen: Soft, non-tender or non-distended without rigidity or guarding and positive bowel sounds all four quadrants. Extremities: No clubbing, cyanosis, or edema bilaterally. Left buttock induration with warmth and bruising  Skin: Skin color, texture, turgor normal.   Neurologic: Alert and oriented X 3,   grossly non-focal.  Mental status: Alert, oriented, thought content appropriate. Data    Recent Labs     10/30/20  1012 10/31/20  0633 11/01/20  0619   WBC 11.6* 15.4* 12.7*   HGB 15.1 13.2* 12.0*   HCT 45.9 39.0* 35.8*    260 229      Recent Labs     10/30/20  1012 10/31/20  0633 11/01/20  0620    137 140   K 4.4 4.0 3.7    102 104   CO2 23 23 25   PHOS  --   --  3.5   BUN 10 22* 28*   CREATININE 0.6* 0.9 0.9     No results for input(s): AST, ALT, ALB, BILIDIR, BILITOT, ALKPHOS in the last 72 hours. No results for input(s): INR in the last 72 hours. Recent Labs     10/30/20  1012   TROPONINI <0.01       Consults:     IP CONSULT TO HOSPITALIST  IP CONSULT TO NEUROLOGY  IP CONSULT TO SOCIAL WORK  IP CONSULT TO PHARMACY  IP CONSULT TO Benny Dinh [address] Problems    Diagnosis Date Noted    Falls frequently [R29.6] 10/30/2020         ASSESSMENT AND PLAN      Fall, unsteady gait:   With previous h/o cereberral CVAs  Cont with tele, Neurochecks, PT/OT, appreciate neurology assistance, primidone restarted  brain MRI non acute  Keep on tele  Has known PFO  Cont with asa, statin     Left gluteal hematoma:  Tramadol as needed, will hold DVT prophylaxis for now, SCD     DM type 2:  Cont with lantus and gabapentin for diabetic neuropathy     Leukocytosis:  Afebrile. Possibly reactive from hematoma. Observe off antibiotics. It is improving. DVT Prophylaxis: SCD  Diet: DIET CARDIAC;  Code Status: Full Code    PT/OT Eval Status:pending    Dispo - inpt, discharge once seen by PT/Ot and placement is arranged. Son Sugar Philip was updated and will pass along to his brother.      Trang Patrick MD

## 2020-11-01 NOTE — PLAN OF CARE
Problem: Falls - Risk of:  Goal: Will remain free from falls  Description: Will remain free from falls  Note: Bed locked in lowest position. 3/4 Bed rails up. Call light within reach. Nonskid footwear in place. Bed and chair exit alarm in use. Pt ambulate with standby assist and walker. Pt unsteady at times and shifts towards the right side upon ambulating. PT/OT worked with patient today and pt will likely discharge to rehab. Pt aggreable to rehab. Pt belongings within reach. Bedside table within reach. Pt instructed to use call light prior to getting up. Will continue to monitor. Problem: Skin Integrity:  Goal: Will show no infection signs and symptoms  Description: Will show no infection signs and symptoms  Note: Pt at risk for skin breakdown d/t weakness and decrease in mobility. Hematoma to left hip. Skin otherwise intact. Pt repositioned every 2 hours and as needed. Heels raised off the bed.  Will continue to monitor for any new s/s of breakdown

## 2020-11-01 NOTE — PROGRESS NOTES
Orthostatic BP/HR as follows:   Lying /58 HR 90  Sitting /63 HR 92  Standing BP 83/45   Pt denies any lightheadedness or dizziness.  Notified Dr. Rosana Avila

## 2020-11-02 LAB
ALBUMIN SERPL-MCNC: 3.7 G/DL (ref 3.4–5)
ANION GAP SERPL CALCULATED.3IONS-SCNC: 9 MMOL/L (ref 3–16)
BASOPHILS ABSOLUTE: 0.1 K/UL (ref 0–0.2)
BASOPHILS RELATIVE PERCENT: 0.7 %
BUN BLDV-MCNC: 18 MG/DL (ref 7–20)
CALCIUM SERPL-MCNC: 8.4 MG/DL (ref 8.3–10.6)
CHLORIDE BLD-SCNC: 106 MMOL/L (ref 99–110)
CO2: 26 MMOL/L (ref 21–32)
CREAT SERPL-MCNC: 0.7 MG/DL (ref 0.8–1.3)
EOSINOPHILS ABSOLUTE: 0.3 K/UL (ref 0–0.6)
EOSINOPHILS RELATIVE PERCENT: 3.3 %
GFR AFRICAN AMERICAN: >60
GFR NON-AFRICAN AMERICAN: >60
GLUCOSE BLD-MCNC: 102 MG/DL (ref 70–99)
GLUCOSE BLD-MCNC: 170 MG/DL (ref 70–99)
GLUCOSE BLD-MCNC: 213 MG/DL (ref 70–99)
GLUCOSE BLD-MCNC: 217 MG/DL (ref 70–99)
GLUCOSE BLD-MCNC: 92 MG/DL (ref 70–99)
HCT VFR BLD CALC: 33.8 % (ref 40.5–52.5)
HEMOGLOBIN: 11.6 G/DL (ref 13.5–17.5)
LYMPHOCYTES ABSOLUTE: 1.4 K/UL (ref 1–5.1)
LYMPHOCYTES RELATIVE PERCENT: 13.1 %
MCH RBC QN AUTO: 32.4 PG (ref 26–34)
MCHC RBC AUTO-ENTMCNC: 34.3 G/DL (ref 31–36)
MCV RBC AUTO: 94.4 FL (ref 80–100)
MONOCYTES ABSOLUTE: 1.2 K/UL (ref 0–1.3)
MONOCYTES RELATIVE PERCENT: 11.1 %
NEUTROPHILS ABSOLUTE: 7.5 K/UL (ref 1.7–7.7)
NEUTROPHILS RELATIVE PERCENT: 71.8 %
PDW BLD-RTO: 13.1 % (ref 12.4–15.4)
PERFORMED ON: ABNORMAL
PHOSPHORUS: 3.1 MG/DL (ref 2.5–4.9)
PLATELET # BLD: 219 K/UL (ref 135–450)
PMV BLD AUTO: 8.5 FL (ref 5–10.5)
POTASSIUM SERPL-SCNC: 3.6 MMOL/L (ref 3.5–5.1)
RBC # BLD: 3.58 M/UL (ref 4.2–5.9)
SODIUM BLD-SCNC: 141 MMOL/L (ref 136–145)
WBC # BLD: 10.4 K/UL (ref 4–11)

## 2020-11-02 PROCEDURE — U0003 INFECTIOUS AGENT DETECTION BY NUCLEIC ACID (DNA OR RNA); SEVERE ACUTE RESPIRATORY SYNDROME CORONAVIRUS 2 (SARS-COV-2) (CORONAVIRUS DISEASE [COVID-19]), AMPLIFIED PROBE TECHNIQUE, MAKING USE OF HIGH THROUGHPUT TECHNOLOGIES AS DESCRIBED BY CMS-2020-01-R: HCPCS

## 2020-11-02 PROCEDURE — 2580000003 HC RX 258: Performed by: INTERNAL MEDICINE

## 2020-11-02 PROCEDURE — 97530 THERAPEUTIC ACTIVITIES: CPT

## 2020-11-02 PROCEDURE — 97535 SELF CARE MNGMENT TRAINING: CPT

## 2020-11-02 PROCEDURE — 97116 GAIT TRAINING THERAPY: CPT

## 2020-11-02 PROCEDURE — 97110 THERAPEUTIC EXERCISES: CPT

## 2020-11-02 PROCEDURE — 80069 RENAL FUNCTION PANEL: CPT

## 2020-11-02 PROCEDURE — 6370000000 HC RX 637 (ALT 250 FOR IP): Performed by: INTERNAL MEDICINE

## 2020-11-02 PROCEDURE — 1200000000 HC SEMI PRIVATE

## 2020-11-02 PROCEDURE — 85025 COMPLETE CBC W/AUTO DIFF WBC: CPT

## 2020-11-02 PROCEDURE — 36415 COLL VENOUS BLD VENIPUNCTURE: CPT

## 2020-11-02 RX ADMIN — ATORVASTATIN CALCIUM 40 MG: 40 TABLET, FILM COATED ORAL at 23:07

## 2020-11-02 RX ADMIN — ASPIRIN 81 MG: 81 TABLET, COATED ORAL at 09:00

## 2020-11-02 RX ADMIN — DOCUSATE SODIUM 100 MG: 100 CAPSULE, LIQUID FILLED ORAL at 09:00

## 2020-11-02 RX ADMIN — GABAPENTIN 300 MG: 300 CAPSULE ORAL at 13:00

## 2020-11-02 RX ADMIN — ACETAMINOPHEN 1000 MG: 500 TABLET ORAL at 06:45

## 2020-11-02 RX ADMIN — ACETAMINOPHEN 1000 MG: 500 TABLET ORAL at 23:07

## 2020-11-02 RX ADMIN — DOCUSATE SODIUM 100 MG: 100 CAPSULE, LIQUID FILLED ORAL at 23:07

## 2020-11-02 RX ADMIN — OXYCODONE HYDROCHLORIDE AND ACETAMINOPHEN 1000 MG: 500 TABLET ORAL at 09:00

## 2020-11-02 RX ADMIN — Medication 10 ML: at 23:08

## 2020-11-02 RX ADMIN — GABAPENTIN 300 MG: 300 CAPSULE ORAL at 09:00

## 2020-11-02 RX ADMIN — INSULIN LISPRO 2 UNITS: 100 INJECTION, SOLUTION INTRAVENOUS; SUBCUTANEOUS at 12:53

## 2020-11-02 RX ADMIN — INSULIN GLARGINE 25 UNITS: 100 INJECTION, SOLUTION SUBCUTANEOUS at 23:09

## 2020-11-02 RX ADMIN — GABAPENTIN 600 MG: 600 TABLET, FILM COATED ORAL at 23:07

## 2020-11-02 RX ADMIN — INSULIN LISPRO 4 UNITS: 100 INJECTION, SOLUTION INTRAVENOUS; SUBCUTANEOUS at 18:22

## 2020-11-02 RX ADMIN — PRIMIDONE 25 MG: 50 TABLET ORAL at 23:27

## 2020-11-02 RX ADMIN — INSULIN LISPRO 4 UNITS: 100 INJECTION, SOLUTION INTRAVENOUS; SUBCUTANEOUS at 09:06

## 2020-11-02 RX ADMIN — Medication 10 ML: at 09:00

## 2020-11-02 ASSESSMENT — PAIN DESCRIPTION - DESCRIPTORS
DESCRIPTORS: SORE
DESCRIPTORS: SORE

## 2020-11-02 ASSESSMENT — PAIN DESCRIPTION - PAIN TYPE
TYPE: ACUTE PAIN
TYPE: ACUTE PAIN

## 2020-11-02 ASSESSMENT — PAIN DESCRIPTION - LOCATION
LOCATION: HIP
LOCATION: HIP

## 2020-11-02 ASSESSMENT — PAIN SCALES - GENERAL
PAINLEVEL_OUTOF10: 0
PAINLEVEL_OUTOF10: 3
PAINLEVEL_OUTOF10: 2
PAINLEVEL_OUTOF10: 2
PAINLEVEL_OUTOF10: 0
PAINLEVEL_OUTOF10: 2

## 2020-11-02 ASSESSMENT — PAIN DESCRIPTION - PROGRESSION: CLINICAL_PROGRESSION: GRADUALLY IMPROVING

## 2020-11-02 ASSESSMENT — PAIN DESCRIPTION - ORIENTATION
ORIENTATION: LEFT
ORIENTATION: LEFT

## 2020-11-02 ASSESSMENT — PAIN DESCRIPTION - FREQUENCY
FREQUENCY: CONTINUOUS
FREQUENCY: INTERMITTENT

## 2020-11-02 ASSESSMENT — PAIN - FUNCTIONAL ASSESSMENT: PAIN_FUNCTIONAL_ASSESSMENT: ACTIVITIES ARE NOT PREVENTED

## 2020-11-02 NOTE — PROGRESS NOTES
Occupational Therapy  Facility/Department: [address]  Daily Treatment Note  NAME: Mahad Barragan  : 10/27/1928  MRN: [id number]    Date of Service: 2020    Discharge Recommendations:  Mahad Barragan scored a 20/24 on the AM-PAC ADL Inpatient form. Current research shows that an AM-PAC score of 17 or less is typically not associated with a discharge to the patient's home setting. Based on the patient's AM-PAC score and their current ADL deficits, it is recommended that the patient have 3-5 sessions per week of Occupational Therapy at d/c to increase the patient's independence. Please see assessment section for further patient specific details. If patient discharges prior to next session this note will serve as a discharge summary. Please see below for the latest assessment towards goals. OT Equipment Recommendations  Equipment Needed: No    Assessment   Assessment: Pt progressing with therapy this date, requiring CGA for funtional mobility/transfers/LB Dressing. Pt was living alone at [facility]. Pt would benefit from inpt therapy at d/c to maximize functional independence and safety before d/c back to Independent Living. Continue with POC,  Treatment Diagnosis: Impaired ADL and mobility  OT Education: OT Role;Transfer Training;Plan of Care  Patient Education: Pt verbalized understanding  REQUIRES OT FOLLOW UP: Yes  Activity Tolerance  Activity Tolerance: Patient Tolerated treatment well  Safety Devices  Safety Devices in place: Yes  Type of devices: Left in chair;Call light within reach; Chair alarm in place         Patient Diagnosis(es): The primary encounter diagnosis was Fall, initial encounter. A diagnosis of Hematoma was also pertinent to this visit. has a past medical history of Diabetes mellitus ([facility] [address] Hyperlipidemia, and Tremor. has no past surgical history on file. Restrictions  Position Activity Restriction  Other position/activity restrictions:  Up with assist, to/from bathroom, toilet transfer/toileting, in stance to groom, ambulation in hallway  Functional Mobility  Functional - Mobility Device: Rolling Walker  Activity: To/from bathroom; Other  Assist Level: Contact guard assistance  Toilet Transfers  Toilet - Technique: Ambulating  Equipment Used: Standard toilet  Toilet Transfer: Contact guard assistance     Transfers  Sit to stand: Contact guard assistance  Stand to sit: Contact guard assistance                       Cognition  Overall Cognitive Status: WNL                                         Plan   Plan  Times per week: 2-5  Current Treatment Recommendations: Strengthening, Balance Training, Functional Mobility Training, Endurance Training, Self-Care / ADL  G-Code     OutComes Score                                                  AM-PAC Score        AM-[facility] Inpatient Daily Activity Raw Score: 20 (11/02/20 1048)  AM-PAC Inpatient ADL T-Scale Score : 42.03 (11/02/20 1048)  ADL Inpatient CMS 0-100% Score: 38.32 (11/02/20 1048)  ADL Inpatient CMS G-Code Modifier : Rob Saeed (11/02/20 1048)    Goals  Short term goals  Time Frame for Short term goals: Discharge  Short term goal 1: Transfer to/from toilet with SBA - pt CGA 11/2 goal not met  Short term goal 2: Stance with SBA x6 min while engaging in ADL/functional mobility - goal not met pt SBA/CGA 11/2  Short term goal 3: Lower body dressing with Min assist and assistive devices as needed - goal met 11/2  Patient Goals   Patient goals : Go home       Therapy Time   Individual Concurrent Group Co-treatment   Time In 0958         Time Out 1025         Minutes 27         Timed Code Treatment Minutes: Boaz Hannah [address], [facility]

## 2020-11-02 NOTE — PROGRESS NOTES
Orthostatic BP as follows:   BP lyin/64 HR 81  BP sittin/52. HR 79  BP standin/47 HR 79.  Pt c/o feeling lightheaded.  Notified Dr. Mcbride Sit

## 2020-11-02 NOTE — PROGRESS NOTES
AM-PAC Score  AM-PAC Inpatient Mobility Raw Score : 19 (11/01/20 1220)  AM-PAC Inpatient T-Scale Score : 45.44 (11/01/20 1220)  Mobility Inpatient CMS 0-100% Score: 41.77 (11/01/20 1220)  Mobility Inpatient CMS G-Code Modifier : CK (11/01/20 1220)          Goals  Short term goals  Time Frame for Short term goals: By discharge-all goals progressing  Short term goal 1: Pt will perform bed mobility with mod I from flat bed  Short term goal 2: Pt will transfer with supervision and use of walker  Short term goal 3: Pt will ambulate x 150' with RW and supervision  Patient Goals   Patient goals :  To return home    Plan    Plan  Times per week: 2-5  Times per day: Daily  Current Treatment Recommendations: Strengthening, IADL Training, Neuromuscular Re-education, Home Exercise Program, ROM, Balance Training, Endurance Training, Safety Education & Training, Functional Mobility Training, Transfer Training, Gait Training, Stair training  Safety Devices  Type of devices: Bed alarm in place, Left in bed, Call light within reach, Nurse notified     Therapy Time   Individual Concurrent Group Co-treatment   Time In 0805         Time Out 0835         Minutes 30         Timed Code Treatment Minutes: 30 Minutes     Timed Code Treatment Minutes:  30 Minutes    Total Treatment Minutes:  30 minutes    Rose Hall PT

## 2020-11-02 NOTE — PROGRESS NOTES
Physician Progress Note      Ulises Leon  CSN #:                  [id number]  :                       10/27/1928  ADMIT DATE:       10/30/2020 9:15 AM  DISCH DATE:  RESPONDING  PROVIDER #:        Abhilash Sheets MD          QUERY TEXT:    Pt admitted with fall. Pt noted to have Hx of Strokes and Diabetic peripheral   neuropathy. If possible, please document in progress notes and discharge   summary the relationship, if any between the following. The medical record reflects the following:  Risk Factors: 79 y/o male with a Hx of DM Ataxia, PFO, [address] CVA  Clinical Indicators: Per Neuro consult note: falls likely secondary to prior   right cerebellar stroke as well as possible component of sensory ataxia from   longstanding diabetic neuropathy. Per Ed PN: \"Chest x-ray clear. X-ray hip   shows no acute process. CT hip shows no active extravasation. Patient with   mild leukocytosis. Normal electrolytes and renal function. Troponin negative. Urinalysis without infection. Patient is unable to ambulate in the ER. 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 , Podiatry consult for evaluation of diabetic foot and neuropathy, f/u   with Neuro outpatient  Options provided:  -- Fall due to age related debility  -- Fall due to Diabetic peripheral neuropathy  -- Fall due to other, please specify. -- Other - I will add my own diagnosis  -- Disagree - Not applicable / Not valid  -- Disagree - Clinically unable to determine / Unknown  -- Refer to Clinical Documentation Reviewer    PROVIDER RESPONSE TEXT:    This patient has Fall due to Diabetic peripheral neuropathy.     Query created by: Mercedes Juárez on 2020 12:47 PM      Electronically signed by:  Sammi Moyer MD 11/2/2020 3:07 PM

## 2020-11-02 NOTE — PROGRESS NOTES
[facility] PROGRESS NOTE    11/2/2020 3:08 PM        Name: Roxie Sandoval . Admitted: 10/30/2020  Primary Care Provider: Brian Carvajal MD (Tel: [phone])    Brief Course:   Patient with a history of PFO, CVA diabetes mellitus admitted with fall found to have left gluteal hematoma history of ataxia and falls. Was significantly orthostatic and still is though he denies any symptoms of orthostasis at home. CC: Fall    Subjective:  .   Patient sitting in the chair  Denies any shortness of breath or chest pain    Reviewed interval ancillary notes    Current Medications  docusate sodium (COLACE) capsule 100 mg, BID  acetaminophen (TYLENOL) tablet 1,000 mg, 3 times per day  primidone (MYSOLINE) tablet 25 mg, Nightly  sodium chloride flush 0.9 % injection 10 mL, 2 times per day  sodium chloride flush 0.9 % injection 10 mL, PRN  polyethylene glycol (GLYCOLAX) packet 17 g, Daily PRN  promethazine (PHENERGAN) tablet 12.5 mg, Q6H PRN    Or  ondansetron (ZOFRAN) injection 4 mg, Q6H PRN  traMADol (ULTRAM) tablet 50 mg, Q6H PRN  atorvastatin (LIPITOR) tablet 40 mg, Nightly  insulin lispro (1 Unit Dial) 0-12 Units, TID WC  insulin lispro (1 Unit Dial) 0-6 Units, Nightly  aspirin EC tablet 81 mg, Daily  gabapentin (NEURONTIN) tablet 600 mg, Nightly  gabapentin (NEURONTIN) capsule 300 mg, Daily  vitamin C (ASCORBIC ACID) tablet 1,000 mg, Daily  gabapentin (NEURONTIN) capsule 300 mg, Lunch  insulin glargine (LANTUS;BASAGLAR) injection pen 25 Units, Nightly  glucose (GLUTOSE) 40 % oral gel 15 g, PRN  dextrose 50 % IV solution, PRN  glucagon (rDNA) injection 1 mg, PRN  dextrose 5 % solution, PRN  lidocaine 4 % external patch 1 patch, Daily        Objective:  /66   Pulse 85   Temp 97 °F (36.1 °C) (Oral)   Resp 18   Ht 6' (1.829 m)   Wt 176 lb 12.9 oz (80.2 kg)   SpO2 97%   BMI 23.98 kg/m²   No intake or output data in the 24 Final Result      1. No findings for acute intracranial abnormality. 2.  Age-related atrophy with patchy periventricular white matter changes bilaterally consistent with chronic small vessel ischemia. XR CHEST (2 VW)   Final Result      1. No discrete findings for acute traumatic bony abnormality within the left hip. 2.  Moderate arthritic changes in the bilateral hips. 3.  Lower lumbar spondylosis. PA LATERAL CHEST. Comparison study 12/22/2017      HISTORY: Fall      FINDINGS: Heart size and mediastinal structures appear within normal limits. No localized consolidation or pleural effusion. Lung volumes are low. Bony structures are intact. Arthritic changes in the bilateral shoulders. IMPRESSION:      1. No findings for acute cardiopulmonary disease in this underinflated chest.      XR HIP 2-3 VW W PELVIS LEFT   Final Result      1. No discrete findings for acute traumatic bony abnormality within the left hip. 2.  Moderate arthritic changes in the bilateral hips. 3.  Lower lumbar spondylosis. PA LATERAL CHEST. Comparison study 12/22/2017      HISTORY: Fall      FINDINGS: Heart size and mediastinal structures appear within normal limits. No localized consolidation or pleural effusion. Lung volumes are low. Bony structures are intact. Arthritic changes in the bilateral shoulders. IMPRESSION:      1. No findings for acute cardiopulmonary disease in this underinflated chest.          Problem List  Active Problems:    Falls frequently  Resolved Problems:    * No resolved hospital problems.  *       Assessment & Plan:   Falls with unsteady gait with orthostatic hypotension with a history of diabetes mellitus  Talking to patient it appears the symptoms of orthostasis are not pronounced at home the falls are more related to him walking and related to probably debility but cannot rule out autonomic neuropathy related to possible diabetes mellitus    PT OT, will recheck orthostasis in the morning if still present and patient symptomatic might consider starting the patient on midodrine discussed with the patient    Left gluteal hematoma  As needed tramadol    Diabetes mellitus type 2  Lantus    Gabapentin for diabetic neuropathy    Leukocytosis improved        IV Access: Peripheral  Higgins:   Diet: DIET CARDIAC;  Code:Full Code  DVT PPX SCDs  Disposition awaiting placement to skilled nursing facility, discussed with , physical therapy notes reviewed,      Ny Sullivan MD   11/2/2020 3:08 PM

## 2020-11-02 NOTE — CARE COORDINATION
CM following for  D/C planning ,  Pt from [facility]  , : plans to return there  And quarantine Vs :  D/C to [facility]  Before return ting to [place] : [facility] [address] [place] [address]       Phone: [phone]       Fax: [phone]        ALEXANDRA submitted:    Document ID: [id number]     CM :  Jero Ghosh  In admissions who has been in touch with the son who lives out of town to discuss options:      Electronically signed by Boyd Severe, RN on 11/2/2020 at 11:23 AM     Boyd Severe  RN Case Manager  [facility] [place].  [address]   [facility] [address] [phone]  Fax [phone]

## 2020-11-03 LAB
ALBUMIN SERPL-MCNC: 3.8 G/DL (ref 3.4–5)
ANION GAP SERPL CALCULATED.3IONS-SCNC: 6 MMOL/L (ref 3–16)
BASOPHILS ABSOLUTE: 0.1 K/UL (ref 0–0.2)
BASOPHILS RELATIVE PERCENT: 0.8 %
BUN BLDV-MCNC: 17 MG/DL (ref 7–20)
CALCIUM SERPL-MCNC: 8.8 MG/DL (ref 8.3–10.6)
CHLORIDE BLD-SCNC: 107 MMOL/L (ref 99–110)
CO2: 29 MMOL/L (ref 21–32)
CREAT SERPL-MCNC: 0.8 MG/DL (ref 0.8–1.3)
EOSINOPHILS ABSOLUTE: 0.4 K/UL (ref 0–0.6)
EOSINOPHILS RELATIVE PERCENT: 4.2 %
GFR AFRICAN AMERICAN: >60
GFR NON-AFRICAN AMERICAN: >60
GLUCOSE BLD-MCNC: 181 MG/DL (ref 70–99)
GLUCOSE BLD-MCNC: 226 MG/DL (ref 70–99)
GLUCOSE BLD-MCNC: 87 MG/DL (ref 70–99)
GLUCOSE BLD-MCNC: 89 MG/DL (ref 70–99)
GLUCOSE BLD-MCNC: 92 MG/DL (ref 70–99)
GLUCOSE BLD-MCNC: 98 MG/DL (ref 70–99)
HCT VFR BLD CALC: 34.9 % (ref 40.5–52.5)
HEMOGLOBIN: 11.9 G/DL (ref 13.5–17.5)
LYMPHOCYTES ABSOLUTE: 1.5 K/UL (ref 1–5.1)
LYMPHOCYTES RELATIVE PERCENT: 15.3 %
MCH RBC QN AUTO: 32.2 PG (ref 26–34)
MCHC RBC AUTO-ENTMCNC: 34 G/DL (ref 31–36)
MCV RBC AUTO: 94.5 FL (ref 80–100)
MONOCYTES ABSOLUTE: 1 K/UL (ref 0–1.3)
MONOCYTES RELATIVE PERCENT: 10.7 %
NEUTROPHILS ABSOLUTE: 6.6 K/UL (ref 1.7–7.7)
NEUTROPHILS RELATIVE PERCENT: 69 %
PDW BLD-RTO: 13.2 % (ref 12.4–15.4)
PERFORMED ON: ABNORMAL
PERFORMED ON: ABNORMAL
PERFORMED ON: NORMAL
PHOSPHORUS: 3 MG/DL (ref 2.5–4.9)
PLATELET # BLD: 231 K/UL (ref 135–450)
PMV BLD AUTO: 8.9 FL (ref 5–10.5)
POTASSIUM SERPL-SCNC: 3.9 MMOL/L (ref 3.5–5.1)
RBC # BLD: 3.69 M/UL (ref 4.2–5.9)
SARS-COV-2, PCR: ABNORMAL
SODIUM BLD-SCNC: 142 MMOL/L (ref 136–145)
WBC # BLD: 9.6 K/UL (ref 4–11)

## 2020-11-03 PROCEDURE — 2580000003 HC RX 258: Performed by: INTERNAL MEDICINE

## 2020-11-03 PROCEDURE — U0003 INFECTIOUS AGENT DETECTION BY NUCLEIC ACID (DNA OR RNA); SEVERE ACUTE RESPIRATORY SYNDROME CORONAVIRUS 2 (SARS-COV-2) (CORONAVIRUS DISEASE [COVID-19]), AMPLIFIED PROBE TECHNIQUE, MAKING USE OF HIGH THROUGHPUT TECHNOLOGIES AS DESCRIBED BY CMS-2020-01-R: HCPCS

## 2020-11-03 PROCEDURE — 6370000000 HC RX 637 (ALT 250 FOR IP): Performed by: INTERNAL MEDICINE

## 2020-11-03 PROCEDURE — 85025 COMPLETE CBC W/AUTO DIFF WBC: CPT

## 2020-11-03 PROCEDURE — 36415 COLL VENOUS BLD VENIPUNCTURE: CPT

## 2020-11-03 PROCEDURE — U0002 COVID-19 LAB TEST NON-CDC: HCPCS

## 2020-11-03 PROCEDURE — 1200000000 HC SEMI PRIVATE

## 2020-11-03 PROCEDURE — 80069 RENAL FUNCTION PANEL: CPT

## 2020-11-03 RX ADMIN — ACETAMINOPHEN 1000 MG: 500 TABLET ORAL at 13:35

## 2020-11-03 RX ADMIN — INSULIN LISPRO 4 UNITS: 100 INJECTION, SOLUTION INTRAVENOUS; SUBCUTANEOUS at 13:35

## 2020-11-03 RX ADMIN — Medication 10 ML: at 20:31

## 2020-11-03 RX ADMIN — GABAPENTIN 300 MG: 300 CAPSULE ORAL at 13:35

## 2020-11-03 RX ADMIN — INSULIN GLARGINE 25 UNITS: 100 INJECTION, SOLUTION SUBCUTANEOUS at 20:31

## 2020-11-03 RX ADMIN — INSULIN LISPRO 1 UNITS: 100 INJECTION, SOLUTION INTRAVENOUS; SUBCUTANEOUS at 20:30

## 2020-11-03 RX ADMIN — ACETAMINOPHEN 1000 MG: 500 TABLET ORAL at 21:25

## 2020-11-03 RX ADMIN — OXYCODONE HYDROCHLORIDE AND ACETAMINOPHEN 1000 MG: 500 TABLET ORAL at 08:57

## 2020-11-03 RX ADMIN — PRIMIDONE 25 MG: 50 TABLET ORAL at 20:28

## 2020-11-03 RX ADMIN — Medication 10 ML: at 08:57

## 2020-11-03 RX ADMIN — GABAPENTIN 300 MG: 300 CAPSULE ORAL at 08:57

## 2020-11-03 RX ADMIN — GABAPENTIN 600 MG: 600 TABLET, FILM COATED ORAL at 20:28

## 2020-11-03 RX ADMIN — ASPIRIN 81 MG: 81 TABLET, COATED ORAL at 08:57

## 2020-11-03 RX ADMIN — ACETAMINOPHEN 1000 MG: 500 TABLET ORAL at 06:14

## 2020-11-03 RX ADMIN — DOCUSATE SODIUM 100 MG: 100 CAPSULE, LIQUID FILLED ORAL at 20:28

## 2020-11-03 RX ADMIN — DOCUSATE SODIUM 100 MG: 100 CAPSULE, LIQUID FILLED ORAL at 08:57

## 2020-11-03 RX ADMIN — ATORVASTATIN CALCIUM 40 MG: 40 TABLET, FILM COATED ORAL at 20:28

## 2020-11-03 ASSESSMENT — PAIN SCALES - GENERAL
PAINLEVEL_OUTOF10: 0
PAINLEVEL_OUTOF10: 2
PAINLEVEL_OUTOF10: 0

## 2020-11-03 NOTE — DISCHARGE SUMMARY
[facility] DISCHARGE SUMMARY    Patient Demographics    Patient. Roxie Sandoval  Date of Birth. 10/27/1928  MRN. [id number]     Primary care provider. Brian Carvajal MD  (Tel: [phone])    Admit date: 10/30/2020    Discharge date (blank if same as Note Date): Note Date: 11/3/2020     Reason for Hospitalization. Chief Complaint   Patient presents with   [facility] Other     Left butt check injury          Significant Findings. Active Problems:    Falls frequently  Resolved Problems:    * No resolved hospital problems. *  Orthostatic hypotension with concerns of autonomic neuropathy  Fall  Diabetes mellitus    Problems and results from this hospitalization that need follow up. 1. Orthostatic hypotension    Significant test results and incidental findings. 1.   MRI BRAIN WO CONTRAST   Final Result   1. Brain atrophy   2. 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 No evidence of acute intracranial hemorrhage      CT PELVIS W CONTRAST Additional Contrast? None   Final Result      Left gluteal hematoma. Large right inguinal hernia containing bowel loops and mesenteric vessels. CT Head WO Contrast   Final Result      1. No findings for acute intracranial abnormality. 2.  Age-related atrophy with patchy periventricular white matter changes bilaterally consistent with chronic small vessel ischemia. XR CHEST (2 VW)   Final Result      1. No discrete findings for acute traumatic bony abnormality within the left hip. 2.  Moderate arthritic changes in the bilateral hips. 3.  Lower lumbar spondylosis. PA LATERAL CHEST. Comparison study 12/22/2017      HISTORY: Fall      FINDINGS: Heart size and mediastinal structures appear within normal limits. No localized consolidation or pleural effusion. Lung volumes are low. Bony structures are intact. Arthritic changes in the bilateral shoulders. IMPRESSION:      1. No findings for acute cardiopulmonary disease in this underinflated chest.      XR HIP 2-3 VW W PELVIS LEFT   Final Result      1. No discrete findings for acute traumatic bony abnormality within the left hip. 2.  Moderate arthritic changes in the bilateral hips. 3.  Lower lumbar spondylosis. PA LATERAL CHEST. Comparison study 12/22/2017      HISTORY: Fall      FINDINGS: Heart size and mediastinal structures appear within normal limits. No localized consolidation or pleural effusion. Lung volumes are low. Bony structures are intact. Arthritic changes in the bilateral shoulders. IMPRESSION:      1. No findings for acute cardiopulmonary disease in this underinflated chest.        Invasive procedures and treatments. [address] Course. Patient with a history of PFO, CVA diabetes mellitus admitted with fall found to have left gluteal hematoma history of ataxia and falls. Was significantly orthostatic and still is though he denies any symptoms of orthostasis at home. The patient is significantly orthostatic hypotensive but without symptoms. Discussed with the patient nonpharmacologic measures like not standing suddenly, sitting at the bedside before standing dangling feet for 15 to 20 seconds wearing thigh-high LISA hose if needed or become symptomatic. In the long run if he becomes symptomatic can consider adding midodrine to the drug regimen. PT OT was consulted patient will be discharged to skilled nursing facility.       Consults. IP CONSULT TO HOSPITALIST  IP CONSULT TO NEUROLOGY  IP CONSULT TO SOCIAL WORK  IP CONSULT TO PHARMACY  IP CONSULT TO PHARMACY    Physical examination on discharge day.    /60   Pulse 82   Temp 98.2 °F (36.8 °C) (Oral)   Resp 17   Ht 6' (1.829 m)   Wt 172 lb 13.5 oz (78.4 kg)   SpO2 96%   BMI 23.44 kg/m²   General appearance. Alert. Looks comfortable. HEENT. Sclera clear. Moist mucus membranes. Cardiovascular. Regular rate and rhythm, normal S1, S2. No murmur. Respiratory. Not using accessory muscles. Clear to auscultation bilaterally, no wheeze. Gastrointestinal. Abdomen soft, non-tender, not distended, normal bowel sounds  Neurology. Facial symmetry. No speech deficits. Moving all extremities equally. Extremities. No edema in lower extremities. Skin. Warm, dry, normal turgor        Condition at time of discharge stable    Medication instructions provided to patient at discharge. Medication List      CHANGE how you take these medications    aspirin 325 MG EC tablet  Take 1 tablet by mouth daily  Start taking on:  November 10, 2020  What changed: These instructions start on November 10, 2020. If you are unsure what to do until then, ask your doctor or other care provider. CONTINUE taking these medications    atorvastatin 40 MG tablet  Commonly known as:  LIPITOR     Bydureon 2 MG Pen  Generic drug:  Exenatide     * gabapentin 300 MG capsule  Commonly known as:  NEURONTIN     * gabapentin 600 MG tablet  Commonly known as:  NEURONTIN     GLUCOSAMINE CHONDR COMPLEX PO     LEVEMIR FLEXPEN SC     metFORMIN 500 MG extended release tablet  Commonly known as:  GLUCOPHAGE-XR     primidone 50 MG tablet  Commonly known as: MYSOLINE     Roller Walker Misc  1 each by Does not apply route daily     vitamin C 500 MG tablet  Commonly known as:  ASCORBIC ACID     vitamin D 50 MCG (2000 UT) Caps capsule         * This list has 2 medication(s) that are the same as other medications prescribed for you. Read the directions carefully, and ask your doctor or other care provider to review them with you.                Where to Get Your Medications      You can get these medications from any pharmacy    Bring a paper prescription for each of these medications  · aspirin 325 MG EC tablet         Discharge recommendations given to patient. Follow Up. Primary care provider  Disposition. SNF  Activity. activity as tolerated  Diet: DIET CARDIAC;      Spent 35 minutes in discharge process.     Signed:  John Purdy MD     11/3/2020 3:20 PM

## 2020-11-03 NOTE — PLAN OF CARE
Problem: Falls - Risk of:  Goal: Will remain free from falls  Description: Will remain free from falls  11/3/2020 1708 by Lakhwinder Cooley RN  Outcome: Ongoing  Note: Pt at risk for falls. Pt admitted with frequent falls. Fall bundle in place. Pt up in chair with chair alarm on. Personal belongings and call light within reach. Pt instructed to call for assistance. Pt calls appropriately. Will continue to monitor. Problem: Skin Integrity:  Goal: Will show no infection signs and symptoms  Description: Will show no infection signs and symptoms  11/3/2020 1708 by Lakhwinder Cooley RN  Outcome: Ongoing  Note: Pt afebrile with a WBC WDL. Pt has no new signs/symptoms of infection at this time. Will continue to monitor. Problem: Pain:  Goal: Pain level will decrease  Description: Pain level will decrease  11/3/2020 1708 by Lakhwinder Cooley RN  Outcome: Ongoing  Note: Pt has made no complaints of pain thus far this shift. Will continue to monitor.

## 2020-11-03 NOTE — DISCHARGE INSTR - COC
Continuity of Care Form    Patient Name: Misael Meyer   :  10/27/1928  MRN:  [id number]    Admit date:  10/30/2020  Discharge date:  ***    Code Status Order: Full Code   Advance Directives:   Advance Care Flowsheet Documentation       Date/Time Healthcare Directive Type of Healthcare Directive Copy in [address] Agent's Name Healthcare Agent's Phone Number    10/30/20 184  Yes, patient has an advance directive for healthcare treatment  Durable power of  for health care  Yes, copy in chart  --  --  --            Admitting Physician:  Kathleen Dale MD  PCP: Wilmar Mejía MD    Discharging Nurse: [facility] Unit/Room#: 6930/8601-49  Discharging Unit Phone Number: ***    Emergency Contact:   Extended Emergency Contact Information  Primary Emergency Contact: [facility] Phone: [phone]  Relation: Child  Secondary Emergency Contact: [facility] Phone: [phone]  Relation: Child    Past Surgical History:  History reviewed. No pertinent surgical history.     Immunization History:   Immunization History   Administered Date(s) Administered    Pneumococcal Conjugate 13-valent ([id number]) 10/10/2015    Pneumococcal Polysaccharide (Cjerraqnh10) 1999       Active Problems:  Patient Active Problem List   Diagnosis Code    Abdominal pain R10.9    Chest pain R07.9    Gallstones K80.20    Ataxia R27.0    TIA (transient ischemic attack) G45.9    PFO (patent foramen ovale) Q21.1    Dysarthria R47.1    Falls frequently R29.6       Isolation/Infection:   Isolation            No Isolation          Patient Infection Status       Infection Onset Added Last Indicated Last Indicated By Review Planned Expiration Resolved Resolved By    COVID-19 Rule Out 20 COVID-19 (Ordered) 11/10/20 11/16/20              Nurse Assessment:  Last Vital Signs: /60   Pulse 82   Temp 98.2 °F (36.8 °C) (Oral)   Resp 17   Ht 6' (1.829 m)   Wt 172 lb 13.5 oz (78.4 kg)   SpO2 96%   BMI 23.44 kg/m²     Last documented pain score (0-10 scale): Pain Level: 0  Last Weight:   Wt Readings from Last 1 Encounters:   11/03/20 172 lb 13.5 oz (78.4 kg)     Mental Status:  oriented    IV Access:  - None    Nursing Mobility/ADLs:  Walking   Assisted  Transfer  Assisted  Bathing  Assisted  Dressing  Assisted  Toileting  Assisted  Feeding  Independent  Med Admin  Independent  Med Delivery   whole    Wound Care Documentation and Therapy:        Elimination:  Continence:   · Bowel: Yes  · Bladder: Yes  Urinary Catheter: None   Colostomy/Ileostomy/Ileal Conduit: No       Date of Last BM: ***    Intake/Output Summary (Last 24 hours) at 11/3/2020 1519  Last data filed at 11/3/2020 0911  Gross per 24 hour   Intake 240 ml   Output --   Net 240 ml     I/O last 3 completed shifts: In: 240 [P.O.:240]  Out: -     Safety Concerns: At risk for falls    Impairments/Disabilities:      None    Nutrition Therapy:  Current Nutrition Therapy:   - Oral Diet:  Cardiac    Routes of Feeding: Oral  Liquids: No Restrictions  Daily Fluid Restriction: no  Last Modified Barium Swallow with Video (Video Swallowing Test): not done    Treatments at the Time of Hospital Discharge:   Respiratory Treatments: ***  Oxygen Therapy:  is not on home oxygen therapy.   Ventilator:    - No ventilator support    Rehab Therapies: Physical Therapy, Occupational Therapy and Speech/Language Therapy  Weight Bearing Status/Restrictions: No weight bearing restirctions  Other Medical Equipment (for information only, NOT a DME order):  walker  Other Treatments: ***    Patient's personal belongings (please select all that are sent with patient):  Adri    RN SIGNATURE:  Electronically signed by Lalita Smith RN on 11/4/20 at 3:22 PM EST    CASE MANAGEMENT/SOCIAL WORK SECTION    Inpatient Status Date: ***    Readmission Risk Assessment Score:  Readmission Risk              Risk of Unplanned Readmission:        13           Discharging to Facility/ Agency   · Name:   · Address:  · Phone:  · Fax:    Dialysis Facility (if applicable)   · Name:  · Address:  · Dialysis Schedule:  · Phone:  · Fax:    / signature: {Esignature:[id number]:::0}    PHYSICIAN SECTION    Prognosis: Fair    Condition at Discharge: Stable    Rehab Potential (if transferring to Rehab): Fair    Recommended Labs or Other Treatments After Discharge: PT/OT    Physician Certification: I certify the above information and transfer of Mary Ba  is necessary for the continuing treatment of the diagnosis listed and that he requires [place] for less 30 days.      Update Admission H&P: No change in H&P    PHYSICIAN SIGNATURE:  Electronically signed by Josefina Quick MD on 11/3/20 at 3:20 PM EST

## 2020-11-03 NOTE — PLAN OF CARE
Problem: Falls - Risk of:  Goal: Will remain free from falls  Description: Will remain free from falls  Outcome: Ongoing  Note: Patient at risk for falls. Patient resting quietly in bed. Side rails up x 3. Bed locked in lowest position. Bed alarm on. Bedside table and call light within reach. Patient instructed to call for assistance. Patient verbalized understanding. Will continue to monitor.          Problem: Falls - Risk of:  Goal: Absence of physical injury  Description: Absence of physical injury  Outcome: Ongoing     Problem: Skin Integrity:  Goal: Will show no infection signs and symptoms  Description: Will show no infection signs and symptoms  Outcome: Ongoing     Problem: Pain:  Goal: Pain level will decrease  Description: Pain level will decrease  Outcome: Ongoing

## 2020-11-04 VITALS
HEIGHT: 72 IN | WEIGHT: 172.84 LBS | RESPIRATION RATE: 18 BRPM | DIASTOLIC BLOOD PRESSURE: 68 MMHG | HEART RATE: 79 BPM | BODY MASS INDEX: 23.41 KG/M2 | SYSTOLIC BLOOD PRESSURE: 114 MMHG | OXYGEN SATURATION: 97 % | TEMPERATURE: 97.1 F

## 2020-11-04 LAB
ALBUMIN SERPL-MCNC: 4 G/DL (ref 3.4–5)
ANION GAP SERPL CALCULATED.3IONS-SCNC: 9 MMOL/L (ref 3–16)
BASOPHILS ABSOLUTE: 0.1 K/UL (ref 0–0.2)
BASOPHILS RELATIVE PERCENT: 0.6 %
BUN BLDV-MCNC: 19 MG/DL (ref 7–20)
CALCIUM SERPL-MCNC: 8.4 MG/DL (ref 8.3–10.6)
CHLORIDE BLD-SCNC: 106 MMOL/L (ref 99–110)
CO2: 26 MMOL/L (ref 21–32)
CREAT SERPL-MCNC: 0.8 MG/DL (ref 0.8–1.3)
EOSINOPHILS ABSOLUTE: 0.4 K/UL (ref 0–0.6)
EOSINOPHILS RELATIVE PERCENT: 3.9 %
GFR AFRICAN AMERICAN: >60
GFR NON-AFRICAN AMERICAN: >60
GLUCOSE BLD-MCNC: 143 MG/DL (ref 70–99)
GLUCOSE BLD-MCNC: 80 MG/DL (ref 70–99)
GLUCOSE BLD-MCNC: 91 MG/DL (ref 70–99)
HCT VFR BLD CALC: 33.6 % (ref 40.5–52.5)
HEMOGLOBIN: 11.5 G/DL (ref 13.5–17.5)
LYMPHOCYTES ABSOLUTE: 1.3 K/UL (ref 1–5.1)
LYMPHOCYTES RELATIVE PERCENT: 14.5 %
MCH RBC QN AUTO: 32.2 PG (ref 26–34)
MCHC RBC AUTO-ENTMCNC: 34.2 G/DL (ref 31–36)
MCV RBC AUTO: 94.2 FL (ref 80–100)
MONOCYTES ABSOLUTE: 0.9 K/UL (ref 0–1.3)
MONOCYTES RELATIVE PERCENT: 9.4 %
NEUTROPHILS ABSOLUTE: 6.6 K/UL (ref 1.7–7.7)
NEUTROPHILS RELATIVE PERCENT: 71.6 %
PDW BLD-RTO: 13.1 % (ref 12.4–15.4)
PERFORMED ON: ABNORMAL
PERFORMED ON: NORMAL
PHOSPHORUS: 3.2 MG/DL (ref 2.5–4.9)
PLATELET # BLD: 232 K/UL (ref 135–450)
PMV BLD AUTO: 8.8 FL (ref 5–10.5)
POTASSIUM SERPL-SCNC: 3.8 MMOL/L (ref 3.5–5.1)
RBC # BLD: 3.57 M/UL (ref 4.2–5.9)
SARS-COV-2: NOT DETECTED
SODIUM BLD-SCNC: 141 MMOL/L (ref 136–145)
WBC # BLD: 9.3 K/UL (ref 4–11)

## 2020-11-04 PROCEDURE — 6370000000 HC RX 637 (ALT 250 FOR IP): Performed by: INTERNAL MEDICINE

## 2020-11-04 PROCEDURE — 2580000003 HC RX 258: Performed by: INTERNAL MEDICINE

## 2020-11-04 PROCEDURE — 80069 RENAL FUNCTION PANEL: CPT

## 2020-11-04 PROCEDURE — 85025 COMPLETE CBC W/AUTO DIFF WBC: CPT

## 2020-11-04 PROCEDURE — 36415 COLL VENOUS BLD VENIPUNCTURE: CPT

## 2020-11-04 RX ADMIN — ACETAMINOPHEN 1000 MG: 500 TABLET ORAL at 14:47

## 2020-11-04 RX ADMIN — INSULIN LISPRO 2 UNITS: 100 INJECTION, SOLUTION INTRAVENOUS; SUBCUTANEOUS at 11:49

## 2020-11-04 RX ADMIN — ACETAMINOPHEN 1000 MG: 500 TABLET ORAL at 05:38

## 2020-11-04 RX ADMIN — GABAPENTIN 300 MG: 300 CAPSULE ORAL at 11:49

## 2020-11-04 RX ADMIN — OXYCODONE HYDROCHLORIDE AND ACETAMINOPHEN 1000 MG: 500 TABLET ORAL at 07:48

## 2020-11-04 RX ADMIN — Medication 10 ML: at 07:59

## 2020-11-04 RX ADMIN — ASPIRIN 81 MG: 81 TABLET, COATED ORAL at 07:49

## 2020-11-04 RX ADMIN — GABAPENTIN 300 MG: 300 CAPSULE ORAL at 07:48

## 2020-11-04 RX ADMIN — DOCUSATE SODIUM 100 MG: 100 CAPSULE, LIQUID FILLED ORAL at 07:48

## 2020-11-04 ASSESSMENT — PAIN SCALES - GENERAL
PAINLEVEL_OUTOF10: 0
PAINLEVEL_OUTOF10: 5

## 2020-11-04 NOTE — PLAN OF CARE
Problem: Falls - Risk of:  Goal: Will remain free from falls  Description: Will remain free from falls  11/4/2020 1213 by Amy Beltre RN  Outcome: Met This Shift     Problem: Skin Integrity:  Goal: Will show no infection signs and symptoms  Description: Will show no infection signs and symptoms  11/4/2020 1213 by Amy Beltre RN  Outcome: Met This Shift     Problem: Pain:  Goal: Pain level will decrease  Description: Pain level will decrease  11/4/2020 1213 by Amy Beltre RN  Outcome: Met This Shift

## 2020-11-04 NOTE — PROGRESS NOTES
Report called to Adry at rehab center. She stated understanding and denied further needs.  Electronically signed by Maribel Rabago RN on 11/4/2020 at 4:01 PM

## 2020-11-04 NOTE — PLAN OF CARE
Problem: Falls - Risk of:  Goal: Will remain free from falls  Description: Will remain free from falls  11/4/2020 0326 by Hayde Burton RN  Outcome: Ongoing  Note: Patient is a fall risk. Patient is a contact assist with a walker. See Fall Risk assessment for details. Bed is in low, lock position; call light/belongings within reach. No attempts to get out of bed have been made, calls appropriately when assistance is needed. Bed alarm and hourly rounds in place for safety. Will continue to monitor and reassess throughout shift. Problem: Falls - Risk of:  Goal: Absence of physical injury  Description: Absence of physical injury  Outcome: Ongoing     Problem: Skin Integrity:  Goal: Will show no infection signs and symptoms  Description: Will show no infection signs and symptoms  11/4/2020 0326 by Hayde Burton RN  Outcome: Ongoing  Note: Pt at risk for skin breakdown. Patient has scattered bruising. Skin assessment as documented. Pts skin cleansed with inc cleanser as needed. Pt repositioned in bed with pillow support as needed. Call light within reach. Will continue to monitor. Problem: Pain:  Goal: Pain level will decrease  Description: Pain level will decrease  11/4/2020 0326 by Sophy Stuart RN  Outcome: Ongoing  Note: Pt resting at this time. No complaints of pain. Encouraged patient to call out with any needs. Will continue to monitor.        Problem: Pain:  Goal: Control of acute pain  Description: Control of acute pain  Outcome: Ongoing     Problem: Pain:  Goal: Control of chronic pain  Description: Control of chronic pain  Outcome: Ongoing

## 2020-11-05 NOTE — DISCHARGE SUMMARY
[address] [facility] DISCHARGE SUMMARY    Patient Demographics    Patient. Susan Israel  Date of Birth. 10/27/1928  MRN. [id number]     Primary care provider. Jhonathan Jiang MD  (Tel: [phone])    Admit date: 10/30/2020    Discharge date (blank if same as Note Date): 11/4/2020  Note Date: 11/4/2020     Reason for Hospitalization. Chief Complaint   Patient presents with   [facility] [place] Other     Left butt check injury          Significant Findings. Active Problems:    Falls frequently  Resolved Problems:    * No resolved hospital problems. *     Orthostatic hypotension with concerns of autonomic neuropathy  Fall  Diabetes mellitus  Problems and results from this hospitalization that need follow up. 1. Orthostatic hypotension    Significant test results and incidental findings. 1.   MRI BRAIN WO CONTRAST   Final Result   1. Brain atrophy   2. 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 No evidence of acute intracranial hemorrhage      CT PELVIS W CONTRAST Additional Contrast? None   Final Result      Left gluteal hematoma. Large right inguinal hernia containing bowel loops and mesenteric vessels. CT Head WO Contrast   Final Result      1. No findings for acute intracranial abnormality. 2.  Age-related atrophy with patchy periventricular white matter changes bilaterally consistent with chronic small vessel ischemia. XR CHEST (2 VW)   Final Result      1. No discrete findings for acute traumatic bony abnormality within the left hip. 2.  Moderate arthritic changes in the bilateral hips. 3.  Lower lumbar spondylosis. PA LATERAL CHEST. Comparison study 12/22/2017      HISTORY: Fall      FINDINGS: Heart size and mediastinal structures appear within normal limits. No localized consolidation or pleural effusion.   Lung volumes are low. Bony structures are intact. Arthritic changes in the bilateral shoulders. IMPRESSION:      1. No findings for acute cardiopulmonary disease in this underinflated chest.      XR HIP 2-3 VW W PELVIS LEFT   Final Result      1. No discrete findings for acute traumatic bony abnormality within the left hip. 2.  Moderate arthritic changes in the bilateral hips. 3.  Lower lumbar spondylosis. PA LATERAL CHEST. Comparison study 12/22/2017      HISTORY: Fall      FINDINGS: Heart size and mediastinal structures appear within normal limits. No localized consolidation or pleural effusion. Lung volumes are low. Bony structures are intact. Arthritic changes in the bilateral shoulders. IMPRESSION:      1. No findings for acute cardiopulmonary disease in this underinflated chest.        Invasive procedures and treatments. [address] Course. Patient with a history of PFO, CVA diabetes mellitus admitted with fall found to have left gluteal hematoma history of ataxia and falls.  Was significantly orthostatic and still is though he denies any symptoms of orthostasis at home. The patient is significantly orthostatic hypotensive but without symptoms. Discussed with the patient nonpharmacologic measures like not standing suddenly, sitting at the bedside before standing dangling feet for 15 to 20 seconds wearing thigh-high LISA hose if needed or become symptomatic. In the long run if he becomes symptomatic can consider adding midodrine to the drug regimen. PT OT was consulted patient will be discharged to skilled nursing facility. Consults. IP CONSULT TO HOSPITALIST  IP CONSULT TO NEUROLOGY  IP CONSULT TO SOCIAL WORK  IP CONSULT TO PHARMACY  IP CONSULT TO PHARMACY    Physical examination on discharge day.    /68   Pulse 79   Temp 97.1 °F (36.2 °C) (Oral)   Resp 18   Ht 6' (1.829 m)   Wt 172 lb 13.5 oz (78.4 kg)   SpO2 97%   BMI 23.44 kg/m² General appearance. Alert. Looks comfortable. HEENT. Sclera clear. Moist mucus membranes. Cardiovascular. Regular rate and rhythm, normal S1, S2. No murmur. Respiratory. Not using accessory muscles. Clear to auscultation bilaterally, no wheeze. Gastrointestinal. Abdomen soft, non-tender, not distended, normal bowel sounds  Neurology. Facial symmetry. No speech deficits. Moving all extremities equally. Left gluteal site bruising      Condition at time of discharge stable    Medication instructions provided to patient at discharge. Medication List      CHANGE how you take these medications    aspirin 325 MG EC tablet  Take 1 tablet by mouth daily  Start taking on:  November 10, 2020  What changed: These instructions start on November 10, 2020. If you are unsure what to do until then, ask your doctor or other care provider. CONTINUE taking these medications    atorvastatin 40 MG tablet  Commonly known as:  LIPITOR     Bydureon 2 MG Pen  Generic drug:  Exenatide     * gabapentin 300 MG capsule  Commonly known as:  NEURONTIN     * gabapentin 600 MG tablet  Commonly known as:  NEURONTIN     GLUCOSAMINE CHONDR COMPLEX PO     LEVEMIR FLEXPEN SC     metFORMIN 500 MG extended release tablet  Commonly known as:  GLUCOPHAGE-XR     primidone 50 MG tablet  Commonly known as: MYSOLINE     Roller Walker Misc  1 each by Does not apply route daily     vitamin C 500 MG tablet  Commonly known as:  ASCORBIC ACID     vitamin D 50 MCG (2000 UT) Caps capsule         * This list has 2 medication(s) that are the same as other medications prescribed for you. Read the directions carefully, and ask your doctor or other care provider to review them with you. Where to Get Your Medications      You can get these medications from any pharmacy    Bring a paper prescription for each of these medications  · aspirin 325 MG EC tablet         Discharge recommendations given to patient. Follow Up. PCP  Disposition. SNF  Activity. activity as tolerated  Diet: No diet orders on file cardiac diet    Spent 35 minutes in discharge process.     Signed:  Dearl Litten, MD     11/4/2020 8:33 PM

## 2020-11-05 NOTE — PROGRESS NOTES
[facility] PROGRESS NOTE    11/4/2020 8:36 PM        Name: Kelechi Hudson . Admitted: 10/30/2020  Primary Care Provider: Kenny Wilder MD (Tel: [phone])    Brief Course:   Patient with a history of PFO, CVA diabetes mellitus admitted with fall found to have left gluteal hematoma history of ataxia and falls. Was significantly orthostatic and still is though he denies any symptoms of orthostasis at home. CC: Fall    Subjective:  . Patient comfortable  Sitting in the chair  Denies any dizziness on ambulation    Reviewed interval ancillary notes    Current Medications  No current facility-administered medications for this encounter. Objective:  /68   Pulse 79   Temp 97.1 °F (36.2 °C) (Oral)   Resp 18   Ht 6' (1.829 m)   Wt 172 lb 13.5 oz (78.4 kg)   SpO2 97%   BMI 23.44 kg/m²     Intake/Output Summary (Last 24 hours) at 11/4/2020 2036  Last data filed at 11/3/2020 2312  Gross per 24 hour   Intake 60 ml   Output --   Net 60 ml      Wt Readings from Last 3 Encounters:   11/03/20 172 lb 13.5 oz (78.4 kg)   05/29/19 183 lb (83 kg)   12/24/17 190 lb (86.2 kg)   Left gluteal area significant bruising noticed  Appears cachectic  Oral mucosa is moist  Orthostatic vitals are positive again today  General appearance:  Appears comfortable  Eyes: Sclera clear. Pupils equal.  ENT: Moist oral mucosa. Trachea midline, no adenopathy. Cardiovascular: Regular rhythm, normal S1, S2. No murmur. No edema in lower extremities  Respiratory: Not using accessory muscles. Good inspiratory effort. Clear to auscultation bilaterally, no wheeze or crackles. GI: Abdomen soft, no tenderness, not distended, normal bowel sounds  Musculoskeletal: No cyanosis in digits, neck supple  Neurology: CN 2-12 grossly intact. No speech or motor deficits  Psych: Normal affect.  Alert and oriented in time, place and person  Skin: Warm, dry, normal turgor  Extremity exam shows brisk capillary refill. Peripheral pulses are palpable in lower extremities     Labs and Tests:  CBC:   Recent Labs     11/02/20  0608 11/03/20  0713 11/04/20  0645   WBC 10.4 9.6 9.3   HGB 11.6* 11.9* 11.5*    231 232     BMP:    Recent Labs     11/02/20  0608 11/03/20  0713 11/04/20  0645    142 141   K 3.6 3.9 3.8    107 106   CO2 26 29 26   BUN 18 17 19   CREATININE 0.7* 0.8 0.8   GLUCOSE 92 87 80     Hepatic: No results for input(s): AST, ALT, ALB, BILITOT, ALKPHOS in the last 72 hours. MRI BRAIN WO CONTRAST   Final Result   1. Brain atrophy   2. 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 No evidence of acute intracranial hemorrhage      CT PELVIS W CONTRAST Additional Contrast? None   Final Result      Left gluteal hematoma. Large right inguinal hernia containing bowel loops and mesenteric vessels. CT Head WO Contrast   Final Result      1. No findings for acute intracranial abnormality. 2.  Age-related atrophy with patchy periventricular white matter changes bilaterally consistent with chronic small vessel ischemia. XR CHEST (2 VW)   Final Result      1. No discrete findings for acute traumatic bony abnormality within the left hip. 2.  Moderate arthritic changes in the bilateral hips. 3.  Lower lumbar spondylosis. PA LATERAL CHEST. Comparison study 12/22/2017      HISTORY: Fall      FINDINGS: Heart size and mediastinal structures appear within normal limits. No localized consolidation or pleural effusion. Lung volumes are low. Bony structures are intact. Arthritic changes in the bilateral shoulders. IMPRESSION:      1. No findings for acute cardiopulmonary disease in this underinflated chest.      XR HIP 2-3 VW W PELVIS LEFT   Final Result      1.   No discrete findings for acute traumatic bony abnormality within the left hip. 2.  Moderate arthritic changes in the bilateral hips. 3.  Lower lumbar spondylosis. PA LATERAL CHEST. Comparison study 12/22/2017      HISTORY: Fall      FINDINGS: Heart size and mediastinal structures appear within normal limits. No localized consolidation or pleural effusion. Lung volumes are low. Bony structures are intact. Arthritic changes in the bilateral shoulders. IMPRESSION:      1. No findings for acute cardiopulmonary disease in this underinflated chest.          Problem List  Active Problems:    Falls frequently  Resolved Problems:    * No resolved hospital problems.  *       Assessment & Plan:   Falls with unsteady gait with orthostatic hypotension with a history of diabetes mellitus  Talking to patient it appears the symptoms of orthostasis are not pronounced at home the falls are more related to him walking and related to probably debility but cannot rule out autonomic neuropathy related to possible diabetes mellitus    PT OT, will recheck orthostasis in the morning if still present and patient symptomatic might consider starting the patient on midodrine discussed with the patient    Left gluteal hematoma  As needed tramadol    Diabetes mellitus type 2  Lantus    Gabapentin for diabetic neuropathy    Leukocytosis improved        IV Access: Peripheral  Higgins:   Diet: No diet orders on file  Code:Prior  DVT PPX SCDs  Disposition awaiting placement to skilled nursing facility, discussed with , physical therapy notes reviewed,      Dearl Litten, MD   11/4/2020 8:36 PM

## 2022-06-11 NOTE — PLAN OF CARE
Increase Independent Mobility
Intervention: Speech Evaluation/treatment  SLP completed evaluation. Please refer to notes in EMR.
Problem: Falls - Risk of:  Goal: Will remain free from falls  Description  Will remain free from falls  5/29/2019 0343 by Mignon Penaloza RN  Outcome: Ongoing   Pt has been free from falls this shift, bed alarm on, bed in lowest position, 2/4 side rails up, nonskid socks on, wheels locked, bedside table and call light in reach. Encouraged pt to call out if needed anything. Problem: ACTIVITY INTOLERANCE/IMPAIRED MOBILITY  Goal: Mobility/activity is maintained at optimum level for patient  Outcome: Ongoing   Pt ambulated in room with standby assist. Pt tolerated it well and showed a steady gait. Pt back in bed safely. Will continue to monitor.
Problem: Falls - Risk of:  Goal: Will remain free from falls  Description  Will remain free from falls  5/29/2019 1247 by Thais Benitez RN  Outcome: Met This Shift  Note:   Patient free of falls/injury during duration of shift. Patient assessed as a high fall risk. Bed in lowest position, wheels locked, call light w/in reach, 2/4 side rails up, bed alarm on, fall risk visual posted outside door & fall risk ID on. Patient instructed to call for assistance prior to getting out of bed. Will continue to monitor. Problem: COMMUNICATION IMPAIRMENT  Goal: Ability to express needs and understand communication  Outcome: Met This Shift  Note:   No evidence of aphasia/ dysarthria noted. Will continue to monitor.
Problem: Falls - Risk of:  Goal: Will remain free from falls  Description  Will remain free from falls  5/31/2019 0847 by Sina Marion RN  Outcome: Ongoing  Note:   Patient has remained free from falls. Bed is low and locked, bed alarm on, side rails up 2/4, non skid socks on patient. Call light and bedside table are within reach. Patient calls out appropriately. Will continue to monitor. Problem: HEMODYNAMIC STATUS  Goal: Patient has stable vital signs and fluid balance  Outcome: Ongoing  Note:   Patient VSS. Will continue to monitor. Problem: ACTIVITY INTOLERANCE/IMPAIRED MOBILITY  Goal: Mobility/activity is maintained at optimum level for patient  5/31/2019 0847 by Sina Marion RN  Outcome: Ongoing  Note:   Patient tolerating ambulation with walker. Will continue to monitor.
Problem: Falls - Risk of:  Goal: Will remain free from falls  Description  Will remain free from falls  Outcome: Ongoing  Note:   Patient high fall risk and is up with assist x1 with the walker. Patient alert and oriented x4, non skid socks on, bed in lowest position and locked, side rails up x2, call light and belongings within reach, bed alarm on for safety, fall sign posted. Will continue to monitor. Problem: HEMODYNAMIC STATUS  Goal: Patient has stable vital signs and fluid balance  Outcome: Ongoing  Note:   VSS with exception to elevated BP, NIHSS 1, neuro checks WNL. Will continue to monitor.
Problem: Falls - Risk of:  Goal: Will remain free from falls  Outcome: Ongoing  Pt remains free from injury during this shift. Encourage pt to call for all assistance. Call light is in reach, bed alarm is activated for safety, bed locked and in lowest position. Will continue to monitor and follow POC. Problem: ACTIVITY INTOLERANCE/IMPAIRED MOBILITY  Goal: Mobility/activity is maintained at optimum level for patient  Outcome: Ongoing  Pt ambulates in the room with no assistance, pt tolerated well. Pt encouraged to use walker for longer distances. Will continue to monitor.
Problem: Falls - Risk of:  Goal: Will remain free from falls  Outcome: Ongoing  Pt remains free from injury during this shift. Pt is up as a stand by assist. Encourage pt to call for all assistance. Call light is in reach, bed alarm is activated, bed locked and in lowest position. Will continue to monitor and follow POC. Problem: HEMODYNAMIC STATUS  Goal: Patient has stable vital signs and fluid balance  Outcome: Ongoing  VSS. No complaints of dizziness. Will continue to monitor.
show

## 2023-04-23 ENCOUNTER — HOSPITAL ENCOUNTER (EMERGENCY)
Age: 88
Discharge: HOME | End: 2023-04-24
Attending: STUDENT IN AN ORGANIZED HEALTH CARE EDUCATION/TRAINING PROGRAM
Payer: MEDICARE

## 2023-04-23 DIAGNOSIS — R10.10 PAIN OF UPPER ABDOMEN: ICD-10-CM

## 2023-04-23 DIAGNOSIS — R04.0 EPISTAXIS: Primary | ICD-10-CM

## 2023-04-23 LAB
BASOPHILS # BLD: 0.1 K/UL (ref 0–0.2)
BASOPHILS NFR BLD: 0.9 %
DEPRECATED RDW RBC AUTO: 13.4 % (ref 12.4–15.4)
EOSINOPHIL # BLD: 0.2 K/UL (ref 0–0.6)
EOSINOPHIL NFR BLD: 2.6 %
HCT VFR BLD AUTO: 41.6 % (ref 40.5–52.5)
HGB BLD-MCNC: 14.2 G/DL (ref 13.5–17.5)
LYMPHOCYTES # BLD: 1.1 K/UL (ref 1–5.1)
LYMPHOCYTES NFR BLD: 12.9 %
MCH RBC QN AUTO: 31.7 PG (ref 26–34)
MCHC RBC AUTO-ENTMCNC: 34.2 G/DL (ref 31–36)
MCV RBC AUTO: 92.9 FL (ref 80–100)
MONOCYTES # BLD: 0.9 K/UL (ref 0–1.3)
MONOCYTES NFR BLD: 10.5 %
NEUTROPHILS # BLD: 6.4 K/UL (ref 1.7–7.7)
NEUTROPHILS NFR BLD: 73.1 %
PLATELET # BLD AUTO: 225 K/UL (ref 135–450)
PMV BLD AUTO: 9.7 FL (ref 5–10.5)
RBC # BLD AUTO: 4.47 M/UL (ref 4.2–5.9)
WBC # BLD AUTO: 8.8 K/UL (ref 4–11)

## 2023-04-23 PROCEDURE — 80048 BASIC METABOLIC PNL TOTAL CA: CPT

## 2023-04-23 PROCEDURE — 36415 COLL VENOUS BLD VENIPUNCTURE: CPT

## 2023-04-23 PROCEDURE — 85025 COMPLETE CBC W/AUTO DIFF WBC: CPT

## 2023-04-23 PROCEDURE — 85610 PROTHROMBIN TIME: CPT

## 2023-04-23 PROCEDURE — 80076 HEPATIC FUNCTION PANEL: CPT

## 2023-04-23 PROCEDURE — 99285 EMERGENCY DEPT VISIT HI MDM: CPT

## 2023-04-23 PROCEDURE — 83690 ASSAY OF LIPASE: CPT

## 2023-04-23 RX ORDER — OXYMETAZOLINE HYDROCHLORIDE 0.05 G/100ML
2 SPRAY NASAL ONCE
Status: DISCONTINUED | OUTPATIENT
Start: 2023-04-23 | End: 2023-04-24 | Stop reason: HOSPADM

## 2023-04-23 ASSESSMENT — PAIN - FUNCTIONAL ASSESSMENT: PAIN_FUNCTIONAL_ASSESSMENT: NONE - DENIES PAIN

## 2023-04-23 ASSESSMENT — LIFESTYLE VARIABLES
HOW OFTEN DO YOU HAVE A DRINK CONTAINING ALCOHOL: NEVER
HOW MANY STANDARD DRINKS CONTAINING ALCOHOL DO YOU HAVE ON A TYPICAL DAY: PATIENT DOES NOT DRINK

## 2023-04-24 ENCOUNTER — HOSPITAL ENCOUNTER (EMERGENCY)
Age: 88
Discharge: HOME | End: 2023-04-25
Attending: EMERGENCY MEDICINE
Payer: MEDICARE

## 2023-04-24 ENCOUNTER — APPOINTMENT (OUTPATIENT)
Dept: CT IMAGING | Age: 88
End: 2023-04-24
Payer: MEDICARE

## 2023-04-24 ENCOUNTER — APPOINTMENT (OUTPATIENT)
Dept: GENERAL RADIOLOGY | Age: 88
End: 2023-04-24
Payer: MEDICARE

## 2023-04-24 VITALS
OXYGEN SATURATION: 93 % | HEART RATE: 78 BPM | SYSTOLIC BLOOD PRESSURE: 154 MMHG | TEMPERATURE: 97.9 F | RESPIRATION RATE: 16 BRPM | DIASTOLIC BLOOD PRESSURE: 98 MMHG

## 2023-04-24 DIAGNOSIS — R07.9 CHEST PAIN, UNSPECIFIED TYPE: Primary | ICD-10-CM

## 2023-04-24 LAB
ALBUMIN SERPL-MCNC: 4.5 G/DL (ref 3.4–5)
ALP SERPL-CCNC: 105 U/L (ref 40–129)
ALT SERPL-CCNC: 25 U/L (ref 10–40)
ANION GAP SERPL CALCULATED.3IONS-SCNC: 13 MMOL/L (ref 3–16)
ANION GAP SERPL CALCULATED.3IONS-SCNC: 14 MMOL/L (ref 3–16)
AST SERPL-CCNC: 26 U/L (ref 15–37)
BASOPHILS # BLD: 0 K/UL (ref 0–0.2)
BASOPHILS NFR BLD: 0.4 %
BILIRUB DIRECT SERPL-MCNC: <0.2 MG/DL (ref 0–0.3)
BILIRUB INDIRECT SERPL-MCNC: NORMAL MG/DL (ref 0–1)
BILIRUB SERPL-MCNC: 0.6 MG/DL (ref 0–1)
BUN SERPL-MCNC: 15 MG/DL (ref 7–20)
BUN SERPL-MCNC: 29 MG/DL (ref 7–20)
CALCIUM SERPL-MCNC: 9.3 MG/DL (ref 8.3–10.6)
CALCIUM SERPL-MCNC: 9.7 MG/DL (ref 8.3–10.6)
CHLORIDE SERPL-SCNC: 104 MMOL/L (ref 99–110)
CHLORIDE SERPL-SCNC: 104 MMOL/L (ref 99–110)
CO2 SERPL-SCNC: 24 MMOL/L (ref 21–32)
CO2 SERPL-SCNC: 26 MMOL/L (ref 21–32)
CREAT SERPL-MCNC: 0.8 MG/DL (ref 0.8–1.3)
CREAT SERPL-MCNC: 1.1 MG/DL (ref 0.8–1.3)
DEPRECATED RDW RBC AUTO: 13.8 % (ref 12.4–15.4)
EOSINOPHIL # BLD: 0 K/UL (ref 0–0.6)
EOSINOPHIL NFR BLD: 0.1 %
GFR SERPLBLD CREATININE-BSD FMLA CKD-EPI: >60 ML/MIN/{1.73_M2}
GFR SERPLBLD CREATININE-BSD FMLA CKD-EPI: >60 ML/MIN/{1.73_M2}
GLUCOSE SERPL-MCNC: 141 MG/DL (ref 70–99)
GLUCOSE SERPL-MCNC: 149 MG/DL (ref 70–99)
HCT VFR BLD AUTO: 42.4 % (ref 40.5–52.5)
HGB BLD-MCNC: 14.2 G/DL (ref 13.5–17.5)
INR PPP: 1.05 (ref 0.84–1.16)
LIPASE SERPL-CCNC: 36 U/L (ref 13–60)
LYMPHOCYTES # BLD: 1.3 K/UL (ref 1–5.1)
LYMPHOCYTES NFR BLD: 11.1 %
MCH RBC QN AUTO: 31.2 PG (ref 26–34)
MCHC RBC AUTO-ENTMCNC: 33.6 G/DL (ref 31–36)
MCV RBC AUTO: 93.1 FL (ref 80–100)
MONOCYTES # BLD: 1.3 K/UL (ref 0–1.3)
MONOCYTES NFR BLD: 11 %
NEUTROPHILS # BLD: 9.4 K/UL (ref 1.7–7.7)
NEUTROPHILS NFR BLD: 77.4 %
PLATELET # BLD AUTO: 259 K/UL (ref 135–450)
PMV BLD AUTO: 9.7 FL (ref 5–10.5)
POTASSIUM SERPL-SCNC: 3.9 MMOL/L (ref 3.5–5.1)
POTASSIUM SERPL-SCNC: 4 MMOL/L (ref 3.5–5.1)
PROT SERPL-MCNC: 7.4 G/DL (ref 6.4–8.2)
PROTHROMBIN TIME: 13.7 SEC (ref 11.5–14.8)
RBC # BLD AUTO: 4.55 M/UL (ref 4.2–5.9)
SODIUM SERPL-SCNC: 141 MMOL/L (ref 136–145)
SODIUM SERPL-SCNC: 144 MMOL/L (ref 136–145)
TROPONIN, HIGH SENSITIVITY: 22 NG/L (ref 0–22)
TROPONIN, HIGH SENSITIVITY: 23 NG/L (ref 0–22)
WBC # BLD AUTO: 12.2 K/UL (ref 4–11)

## 2023-04-24 PROCEDURE — 93005 ELECTROCARDIOGRAM TRACING: CPT | Performed by: EMERGENCY MEDICINE

## 2023-04-24 PROCEDURE — 84484 ASSAY OF TROPONIN QUANT: CPT

## 2023-04-24 PROCEDURE — 80048 BASIC METABOLIC PNL TOTAL CA: CPT

## 2023-04-24 PROCEDURE — 85025 COMPLETE CBC W/AUTO DIFF WBC: CPT

## 2023-04-24 PROCEDURE — 36415 COLL VENOUS BLD VENIPUNCTURE: CPT

## 2023-04-24 PROCEDURE — 96374 THER/PROPH/DIAG INJ IV PUSH: CPT

## 2023-04-24 PROCEDURE — 99285 EMERGENCY DEPT VISIT HI MDM: CPT

## 2023-04-24 PROCEDURE — 6370000000 HC RX 637 (ALT 250 FOR IP): Performed by: EMERGENCY MEDICINE

## 2023-04-24 PROCEDURE — 71046 X-RAY EXAM CHEST 2 VIEWS: CPT

## 2023-04-24 PROCEDURE — 6360000004 HC RX CONTRAST MEDICATION: Performed by: STUDENT IN AN ORGANIZED HEALTH CARE EDUCATION/TRAINING PROGRAM

## 2023-04-24 PROCEDURE — 74177 CT ABD & PELVIS W/CONTRAST: CPT

## 2023-04-24 PROCEDURE — 6360000002 HC RX W HCPCS: Performed by: EMERGENCY MEDICINE

## 2023-04-24 RX ORDER — LIDOCAINE 4 G/G
1 PATCH TOPICAL ONCE
Status: DISCONTINUED | OUTPATIENT
Start: 2023-04-24 | End: 2023-04-25 | Stop reason: HOSPADM

## 2023-04-24 RX ORDER — KETOROLAC TROMETHAMINE 30 MG/ML
15 INJECTION, SOLUTION INTRAMUSCULAR; INTRAVENOUS ONCE
Status: COMPLETED | OUTPATIENT
Start: 2023-04-24 | End: 2023-04-24

## 2023-04-24 RX ADMIN — KETOROLAC TROMETHAMINE 15 MG: 30 INJECTION, SOLUTION INTRAMUSCULAR at 23:16

## 2023-04-24 RX ADMIN — IOPAMIDOL 75 ML: 755 INJECTION, SOLUTION INTRAVENOUS at 01:43

## 2023-04-24 ASSESSMENT — PAIN SCALES - GENERAL: PAINLEVEL_OUTOF10: 3

## 2023-04-24 ASSESSMENT — PAIN DESCRIPTION - LOCATION: LOCATION: CHEST

## 2023-04-24 NOTE — ED PROVIDER NOTES
[facility] ENCOUNTER          ATTENDING PHYSICIAN NOTE       Date of evaluation: 4/23/2023    Chief Complaint     Epistaxis (Pt states nose started bleeding around 9pm and could not get it to stop.)      History of Present Illness     Fabiana Mendez is a 80 y.o. male who presents with epistaxis    Patient relates that earlier tonight he began having spontaneous bleeding from the left nare. He tried pressure iwthout success. Most of the bleeding was from the nose, but he does feel he swallowed a small amount as well. He denies CP/SOB and denies lightheadedness. He denies prior history of epistaxis. He does not use anticoagulant or antiplatelet medications other than aspirin. He had felt well prior to this episode. 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 male with epistaxis    I instructed the patient to clear clots by forcefully blowing his nose. 2 sprays of Afrin were applied to the left nare. Pressure was held by me for 10 minutes. The epistaxis resolved and did not resume. The patient was mildly hypertensive on arrival but this improved without intervention. This may have contributed to his bleeding. This was clearly an anterior bleed by historical factors and my exam.    Subsequently patient began complaining of his abdomen hurting. I counseled him that this was likely 2/2 irritation from swallowed blood but he expressed concern and after discussion preferred CT scan. This was obtained. A Chronic right-sided inguinal hernia was seen, but I directly examiend this area again and confirmed with him that this was a chornic issue and remote from any site of pain. The pain had improved without intevention as well. An incidental renal mass was seen.  I discussed this

## 2023-04-24 NOTE — DISCHARGE INSTRUCTIONS
We saw you in the hospital for nosebleed and abdominal pain. A CT scan of your abdomen showed two likely chronic problems for which follow-up with your primary doctor is recommended:  -Very large right-sided inguinal hernia containing multiple loops of bowel.   -Indeterminate lesion arising from the anterior aspect of the left kidney. This could either reflect a small renal cell carcinoma or a hemorrhagic/proteinaceous cyst.    You were treated with Afrin    Continue your medications at home    You need to see your regular doctor in 2-3 days to be checked. You should return to the emergency department if your symptoms worsen or do not resolve. In addition, return if:  - You have a fever (greater than 101 degrees)  - You have chest pain, shortness of breath, abdominal pain, or uncontrollable vomiting  - You are unable to eat or drink  - You pass out  - You have difficulty moving your arms or legs   - You have difficulty speaking or slurred speech  - Or you have any concern that you feel needs acute physician evaluation.

## 2023-04-25 VITALS
BODY MASS INDEX: 21.98 KG/M2 | TEMPERATURE: 97.6 F | SYSTOLIC BLOOD PRESSURE: 163 MMHG | HEIGHT: 74 IN | WEIGHT: 171.3 LBS | HEART RATE: 79 BPM | RESPIRATION RATE: 22 BRPM | OXYGEN SATURATION: 100 % | DIASTOLIC BLOOD PRESSURE: 84 MMHG

## 2023-04-25 LAB
EKG ATRIAL RATE: 86 BPM
EKG DIAGNOSIS: NORMAL
EKG P AXIS: 24 DEGREES
EKG P-R INTERVAL: 294 MS
EKG Q-T INTERVAL: 356 MS
EKG QRS DURATION: 102 MS
EKG QTC CALCULATION (BAZETT): 426 MS
EKG R AXIS: 46 DEGREES
EKG T AXIS: 179 DEGREES
EKG VENTRICULAR RATE: 86 BPM

## 2023-04-25 NOTE — ED PROVIDER NOTES
[address]          ATTENDING PHYSICIAN NOTE       Date of evaluation: 4/24/2023    ADDENDUM:      Care of this patient was assumed from ***. The patient was seen for Chest Pain  . The patient's initial evaluation and plan have been discussed with the prior provider who initially evaluated the patient. Nursing Notes, Past Medical Hx, Past Surgical Hx, Social Hx, Allergies, and Family Hx were all reviewed. At the time of turnover, the remaining items are still pending in her work-up and will be followed up by myself: ***. On my assessment, the patient is ***. No known CAD, p/w CP sharp anterior R sided worse with movement +/- reproducible, EKG unchanged from prior    F/u labs, likely dispo home to assisted living    Diagnostic Results     Labs:  Please see electronic medical record for any tests performed in the ED     Radiology:  Please see electronic medical record for any tests performed in the ED    Procedures     ***    ED Course          The patient was given the following medications:  Orders Placed This Encounter   Medications    lidocaine 4 % external patch 1 patch    ketorolac (TORADOL) injection 15 mg       CONSULTS:  None    MEDICAL DECISION MAKING / ASSESSMENT / Luna Too is a 80 y.o. male ***.  ***    Critical Care:  Due to the immediate potential for life-threatening deterioration due to ***, I spent *** minutes providing critical care. This time excludes time spent performing procedures but includes time spent on direct patient care, history retrieval, review of the chart, and discussions with patient, family, and consultant(s). Clinical Impression     No diagnosis found. Disposition     PATIENT REFERRED TO:  No follow-up provider specified.     DISCHARGE MEDICATIONS:  New Prescriptions    No medications on file       DISPOSITION
INSULIN DETEMIR (LEVEMIR FLEXPEN SC)    Inject 30 Units into the skin nightly    METFORMIN (GLUCOPHAGE-XR) 500 MG EXTENDED RELEASE TABLET    Take 2,000 mg by mouth nightly    MISC. DEVICES (ROLLER WALKER) MISC    1 each by Does not apply route daily    PRIMIDONE (MYSOLINE) 50 MG TABLET    Take 25 mg by mouth nightly    VITAMIN C (ASCORBIC ACID) 500 MG TABLET    Take 1,000 mg by mouth daily       Allergies     He is allergic to cat hair extract and tetracyclines & related.     Physical Exam     INITIAL VITALS: BP: (!) 156/97, Temp: 97.6 °F (36.4 °C), Heart Rate: 89, Resp: 20, SpO2: 96 %   General: 80year-old male lying in bed no apparent cardiorespiratory distress  HEENT:  head is atraumatic, pupils equal round and reactive to light, sclera are clear, oropharynx is nonerythematous  Neck: supple, no lymphadenopathy  Chest: nonlabored respirations, equal chest rise bilaterally, no accessory muscle use, mild tenderness palpation across the right anterior chest  Cardiovascular: Regular, rate, and rhythm, 2+ radial pulses bilaterally, capillary refill 2 seconds  Abdominal: Soft, nontender, nondistended, no rebound or guarding  Skin: Warm, dry well perfused, no rashes  Musculoskeletal: no obvious deformities, no tenderness to palpation diffusely  Neurologic:  alert and oriented x4, speech is clear and intact without dysarthria                 Paty Prasad MD  04/24/23 5224

## 2023-04-25 NOTE — ED NOTES
Pt discharged back home, reviewed discharged instructions with pt follow up care , pain control and return precautions discussed . Pt transported to [facility] via ambulance.       Severo Foot, RN  04/25/23 3331

## 2023-04-25 NOTE — DISCHARGE INSTRUCTIONS
We did not find a clear cause for your chest pain today. Take Ibuprofen or Tylenol for your pain. Having chest pain does not necessarily mean you are having a heart attack. Most people who go to the emergency room with chest pain are not having a heart attack. Their pain is usually caused by less serious problems, such as muscle pain, heartburn, or anxiety. Follow up with your physician or clinic within the next 2-3 days. Return to the ED immediately if you have worse or continued chest pain, shortness of breath, nausea, sweating during chest pain, trouble breathing, chest pain with exertion (climbing stairs or walking for example), or any other concerns. Chest pain is not the only symptom of a heart attack and some people have heart attacks without having any pain at all. This is more likely in women, people with diabetes, and people older than 61. Chest pain can be caused by lots of other problems, including:  ? Heart problems other than heart attacks, such as infection around the heart  ? Muscle soreness after an activity that involves the chest muscles  ? Diseases that cause pain, such as arthritis  ? Shingles (herpes zoster), a condition linked to the chickenpox virus that also causes a painful rash  ? Any kind of injury to the chest, including surgery  ? Digestive problems such as heartburn, acid reflux, stomach ulcers, or irritable bowel syndrome  ? Problems affecting the lungs, such as pneumonia (an infection in the lungs) or blood clots in the lungs  ? Psychological problems, such as panic disorder or depression  ?  Weakening of the lining of the big blood vessel in the chest (called the aorta)